# Patient Record
Sex: FEMALE | Race: WHITE | NOT HISPANIC OR LATINO | Employment: OTHER | URBAN - METROPOLITAN AREA
[De-identification: names, ages, dates, MRNs, and addresses within clinical notes are randomized per-mention and may not be internally consistent; named-entity substitution may affect disease eponyms.]

---

## 2018-02-15 ENCOUNTER — HISTORICAL (OUTPATIENT)
Dept: ENDOSCOPY | Facility: HOSPITAL | Age: 78
End: 2018-02-15

## 2022-04-11 ENCOUNTER — HISTORICAL (OUTPATIENT)
Dept: ADMINISTRATIVE | Facility: HOSPITAL | Age: 82
End: 2022-04-11

## 2022-04-29 VITALS
HEIGHT: 63 IN | DIASTOLIC BLOOD PRESSURE: 64 MMHG | OXYGEN SATURATION: 92 % | WEIGHT: 128.06 LBS | SYSTOLIC BLOOD PRESSURE: 97 MMHG | BODY MASS INDEX: 22.69 KG/M2

## 2022-04-30 NOTE — H&P
HISTORY OF PRESENT ILLNESS:  [age]-year-old white female referred for screening colonoscopy.  The patient has been doing well and denies any melena, hematochezia or change in bowel functions.  She was found to have diverticulosis during her last exam in 2008, but she had no evidence of polyps.    ALLERGIES:  NKDA    MEDICATIONS:    1. Omeprazole.  2. Celecoxib.  3. Cyclobenzaprine.  4. Folic acid.  5. Escitalopram.  6. Hydroxychloroquine.  7. _______ topical.  8. Methotrexate.  9. Simvastatin.    PAST MEDICAL HISTORY:    1. Gastroesophageal reflux disease.  2. Rheumatoid arthritis.  3. Ménière disease.  4. Hypercholesterolemia.    PAST SURGICAL HISTORY:    1. Appendectomy.  2. Cholecystectomy.  3. Toe surgery.    SOCIAL HISTORY:  She lives in [place],  with two grown children.  Retired .  No smoking, no alcohol.    FAMILY HISTORY:  Noncontributory    PHYSICAL EXAMINATION:  VITAL SIGNS:  Stable.  Afebrile.   GENERAL:  She is a healthy appearing middle-aged female in no acute distress.   HEENT:  Sclerae nonicteric.  Conjunctivae pink.   NECK:  No adenopathy or thyromegaly.   CARDIOVASCULAR:  Regular rate and rhythm.   LUNGS:  Clear.   ABDOMEN:  Soft, nontender.  Bowel sounds normal.  No mass or organomegaly appreciated.     EXTREMITIES:  No clubbing, cyanosis, or edema.   NEURO:  Alert and oriented.  No focal deficits.    IMPRESSION:  Average risk for colon polyps.    RECOMMENDATIONS:  Proceed with screening colonoscopy.        ______________________________  MD EMILIANO Martell/VIRI  DD:  02/15/2018  Time:  10:01AM  DT:  02/15/2018  Time:  10:29AM  Job #:  [id number]    The H&P was reviewed, the patient was examined, and the following changes to the patients condition are  noted:  ______________________________________________________________________________  ______________________________________________________________________________  ______________________________________________________________________________  [  ] No changes to the patient's condition:      ______________________________                                             ___________________  PHYSICIAN SIGNATURE                                                             DATE/TIME    cc: Rishi Hart M.D.

## 2023-04-19 ENCOUNTER — OFFICE VISIT (OUTPATIENT)
Dept: RHEUMATOLOGY | Facility: CLINIC | Age: 83
End: 2023-04-19
Payer: MEDICARE

## 2023-04-19 VITALS
TEMPERATURE: 99 F | OXYGEN SATURATION: 98 % | HEIGHT: 62 IN | WEIGHT: 126.19 LBS | DIASTOLIC BLOOD PRESSURE: 74 MMHG | HEART RATE: 73 BPM | BODY MASS INDEX: 23.22 KG/M2 | SYSTOLIC BLOOD PRESSURE: 118 MMHG

## 2023-04-19 DIAGNOSIS — M79.7 FIBROMYALGIA SYNDROME: ICD-10-CM

## 2023-04-19 DIAGNOSIS — G47.00 INSOMNIA, UNSPECIFIED TYPE: ICD-10-CM

## 2023-04-19 DIAGNOSIS — M06.9 RHEUMATOID ARTHRITIS, INVOLVING UNSPECIFIED SITE, UNSPECIFIED WHETHER RHEUMATOID FACTOR PRESENT: Primary | ICD-10-CM

## 2023-04-19 DIAGNOSIS — M81.0 OSTEOPOROSIS, UNSPECIFIED OSTEOPOROSIS TYPE, UNSPECIFIED PATHOLOGICAL FRACTURE PRESENCE: ICD-10-CM

## 2023-04-19 PROCEDURE — 1159F PR MEDICATION LIST DOCUMENTED IN MEDICAL RECORD: ICD-10-PCS | Mod: CPTII,S$GLB,, | Performed by: INTERNAL MEDICINE

## 2023-04-19 PROCEDURE — 3288F FALL RISK ASSESSMENT DOCD: CPT | Mod: CPTII,S$GLB,, | Performed by: INTERNAL MEDICINE

## 2023-04-19 PROCEDURE — 3078F DIAST BP <80 MM HG: CPT | Mod: CPTII,S$GLB,, | Performed by: INTERNAL MEDICINE

## 2023-04-19 PROCEDURE — 99999 PR PBB SHADOW E&M-EST. PATIENT-LVL III: CPT | Mod: PBBFAC,,, | Performed by: INTERNAL MEDICINE

## 2023-04-19 PROCEDURE — 1101F PR PT FALLS ASSESS DOC 0-1 FALLS W/OUT INJ PAST YR: ICD-10-PCS | Mod: CPTII,S$GLB,, | Performed by: INTERNAL MEDICINE

## 2023-04-19 PROCEDURE — 1125F PR PAIN SEVERITY QUANTIFIED, PAIN PRESENT: ICD-10-PCS | Mod: CPTII,S$GLB,, | Performed by: INTERNAL MEDICINE

## 2023-04-19 PROCEDURE — 1101F PT FALLS ASSESS-DOCD LE1/YR: CPT | Mod: CPTII,S$GLB,, | Performed by: INTERNAL MEDICINE

## 2023-04-19 PROCEDURE — 3288F PR FALLS RISK ASSESSMENT DOCUMENTED: ICD-10-PCS | Mod: CPTII,S$GLB,, | Performed by: INTERNAL MEDICINE

## 2023-04-19 PROCEDURE — 3074F PR MOST RECENT SYSTOLIC BLOOD PRESSURE < 130 MM HG: ICD-10-PCS | Mod: CPTII,S$GLB,, | Performed by: INTERNAL MEDICINE

## 2023-04-19 PROCEDURE — 1159F MED LIST DOCD IN RCRD: CPT | Mod: CPTII,S$GLB,, | Performed by: INTERNAL MEDICINE

## 2023-04-19 PROCEDURE — 99204 PR OFFICE/OUTPT VISIT, NEW, LEVL IV, 45-59 MIN: ICD-10-PCS | Mod: S$GLB,,, | Performed by: INTERNAL MEDICINE

## 2023-04-19 PROCEDURE — 3074F SYST BP LT 130 MM HG: CPT | Mod: CPTII,S$GLB,, | Performed by: INTERNAL MEDICINE

## 2023-04-19 PROCEDURE — 3078F PR MOST RECENT DIASTOLIC BLOOD PRESSURE < 80 MM HG: ICD-10-PCS | Mod: CPTII,S$GLB,, | Performed by: INTERNAL MEDICINE

## 2023-04-19 PROCEDURE — 1125F AMNT PAIN NOTED PAIN PRSNT: CPT | Mod: CPTII,S$GLB,, | Performed by: INTERNAL MEDICINE

## 2023-04-19 PROCEDURE — 99999 PR PBB SHADOW E&M-EST. PATIENT-LVL III: ICD-10-PCS | Mod: PBBFAC,,, | Performed by: INTERNAL MEDICINE

## 2023-04-19 PROCEDURE — 99204 OFFICE O/P NEW MOD 45 MIN: CPT | Mod: S$GLB,,, | Performed by: INTERNAL MEDICINE

## 2023-04-19 RX ORDER — LEVOCETIRIZINE DIHYDROCHLORIDE 5 MG/1
1 TABLET, FILM COATED ORAL DAILY
COMMUNITY
Start: 2022-09-29

## 2023-04-19 RX ORDER — EZETIMIBE 10 MG/1
1 TABLET ORAL DAILY
COMMUNITY
Start: 2023-04-10

## 2023-04-19 RX ORDER — LEVOTHYROXINE SODIUM 75 UG/1
1 TABLET ORAL DAILY
COMMUNITY

## 2023-04-19 RX ORDER — TIZANIDINE 2 MG/1
2 TABLET ORAL NIGHTLY
Qty: 30 TABLET | Refills: 5 | Status: SHIPPED | OUTPATIENT
Start: 2023-04-19 | End: 2023-05-10

## 2023-04-19 RX ORDER — ROSUVASTATIN CALCIUM 40 MG/1
1 TABLET, COATED ORAL DAILY
COMMUNITY
Start: 2023-04-10

## 2023-04-19 RX ORDER — FLUOXETINE HYDROCHLORIDE 40 MG/1
1 CAPSULE ORAL DAILY
COMMUNITY
Start: 2023-04-10

## 2023-04-19 RX ORDER — FOLIC ACID 1 MG/1
1 TABLET ORAL DAILY
COMMUNITY
Start: 2022-09-29 | End: 2023-04-19

## 2023-04-19 RX ORDER — OMEPRAZOLE 20 MG/1
1 CAPSULE, DELAYED RELEASE ORAL DAILY
COMMUNITY
Start: 2022-09-29

## 2023-04-19 RX ORDER — METHOTREXATE 2.5 MG/1
10 TABLET ORAL
Qty: 20 TABLET | Refills: 5 | Status: SHIPPED | OUTPATIENT
Start: 2023-04-19 | End: 2023-08-18 | Stop reason: SDUPTHER

## 2023-04-19 RX ORDER — FOLIC ACID 1 MG/1
1 TABLET ORAL DAILY
Qty: 30 TABLET | Refills: 5 | Status: SHIPPED | OUTPATIENT
Start: 2023-04-19 | End: 2023-08-18 | Stop reason: SDUPTHER

## 2023-04-19 RX ORDER — AMOXICILLIN 875 MG/1
1 TABLET, FILM COATED ORAL EVERY 12 HOURS
COMMUNITY

## 2023-04-19 RX ORDER — BUDESONIDE, GLYCOPYRROLATE, AND FORMOTEROL FUMARATE 160; 9; 4.8 UG/1; UG/1; UG/1
1 AEROSOL, METERED RESPIRATORY (INHALATION) 3 TIMES DAILY PRN
COMMUNITY
Start: 2023-03-07

## 2023-04-19 NOTE — PROGRESS NOTES
"Subjective:       Patient ID: Ginette Mcgovern is a 82 y.o. female.    Chief Complaint: Initial Visit (Initial visit. Patient states she was being treated for RA by another rheumatologist. She complains of pain in both knees and lower back. Pain 6/10. Patient was taking Prolia injections her last one was about 7 months ago. )    Pt  is complaining of joint pain involving his MCP PIP wrist elbow shoulders hips knees and ankles bilaterally.  Is 9/10 in intensity dull in quality and continuous.  It is associated with a morning stiffness lasting for more than 60 minutes. Pt reports having difficulty maintaining a good night of sleep and this has been associated with myalgia of 9/10 in intensity.  This pain is dull continuous and gets worse mainly at night.  It is associated with fatigue.  No fever no chills no others.    Labs checked during this visit       Review of Systems   Constitutional:  Negative for appetite change, chills and fever.   HENT:  Negative for congestion, ear pain, mouth sores, nosebleeds and trouble swallowing.    Eyes:  Negative for photophobia and discharge.   Respiratory:  Negative for chest tightness and shortness of breath.    Cardiovascular:  Negative for chest pain.   Gastrointestinal:  Negative for abdominal pain and vomiting.   Endocrine: Negative.    Genitourinary:  Negative for hematuria.   Musculoskeletal:         As per HPI   Skin:  Negative for rash.   Neurological:  Negative for weakness.       Objective:   /74 (BP Location: Left arm, Patient Position: Sitting, BP Method: Small (Automatic))   Pulse 73   Temp 98.6 °F (37 °C) (Oral)   Ht 5' 2" (1.575 m)   Wt 57.2 kg (126 lb 3.2 oz)   SpO2 98%   BMI 23.08 kg/m²      Physical Exam   Constitutional: She is oriented to person, place, and time. She appears well-developed and well-nourished. No distress.   HENT:   Head: Normocephalic and atraumatic.   Right Ear: External ear normal.   Left Ear: External ear normal.   Eyes: Pupils " are equal, round, and reactive to light.   Cardiovascular: Normal rate, regular rhythm and normal heart sounds.   Pulmonary/Chest: Breath sounds normal.   Abdominal: Soft. There is no abdominal tenderness.   Musculoskeletal:      Right shoulder: Tenderness present.      Left shoulder: Tenderness present.      Right elbow: Tenderness present.      Left elbow: Tenderness present.      Right wrist: Tenderness present.      Left wrist: Tenderness present.      Cervical back: Neck supple.      Right hip: Tenderness present.      Left hip: Tenderness present.      Right knee: Tenderness present.      Left knee: Tenderness present.      Right ankle: Tenderness present.      Left ankle: Tenderness present.   Lymphadenopathy:     She has no cervical adenopathy.   Neurological: She is alert and oriented to person, place, and time. She displays normal reflexes. No cranial nerve deficit or sensory deficit. She exhibits normal muscle tone. Coordination normal.   Skin: No rash noted. No erythema.   Vitals reviewed.      Right Side Rheumatological Exam     The patient is tender to palpation of the shoulder, elbow, wrist, knee, 1st PIP, 1st MCP, 2nd PIP, 2nd MCP, 3rd PIP, 3rd MCP, 4th PIP, 4th MCP, 5th PIP, hip, ankle, 1st MTP, 2nd MTP, 3rd MTP, 4th MTP, 5th MTP, 1st toe IP, 2nd toe IP, 3rd toe IP, 4th toe IP and 5th toe IP    Left Side Rheumatological Exam     The patient is tender to palpation of the shoulder, elbow, wrist, knee, 1st PIP, 1st MCP, 2nd PIP, 2nd MCP, 3rd PIP, 3rd MCP, 4th PIP, 4th MCP, 5th PIP, 5th MCP, hip, ankle, 1st MTP, 2nd MTP, 3rd MTP, 4th MTP, 5th MTP, 1st toe IP, 2nd toe IP, 3rd toe IP, 4th toe IP and 5th toe IP.       Completed Fibromyalgia exam 18/18 tender points.  No data to display     Orders for prolia written during this visit.     Assessment:       1. Rheumatoid arthritis, involving unspecified site, unspecified whether rheumatoid factor present    2. Fibromyalgia syndrome    3. Insomnia,  unspecified type    4. Osteoporosis, unspecified osteoporosis type, unspecified pathological fracture presence          Medication List with Changes/Refills   New Medications    FOLIC ACID (FOLVITE) 1 MG TABLET    Take 1 tablet (1 mg total) by mouth once daily. After food       Start Date: 4/19/2023 End Date: 10/16/2023    METHOTREXATE 2.5 MG TAB    Take 4 tablets (10 mg total) by mouth every 7 days. EVERY        TAKE 4 TAB TOGETHER AFTER SUPPER       Start Date: 4/19/2023 End Date: 10/16/2023    TIZANIDINE (ZANAFLEX) 2 MG TABLET    Take 1 tablet (2 mg total) by mouth nightly.       Start Date: 4/19/2023 End Date: 10/16/2023   Current Medications    AMOXICILLIN (AMOXIL) 875 MG TABLET    Take 1 tablet by mouth every 12 (twelve) hours.       Start Date: --        End Date: --    BREZTRI AEROSPHERE 160-9-4.8 MCG/ACTUATION HFAA    Inhale 1 puff into the lungs 3 (three) times daily as needed.       Start Date: 3/7/2023  End Date: --    EZETIMIBE (ZETIA) 10 MG TABLET    Take 1 tablet by mouth once daily.       Start Date: 4/10/2023 End Date: --    FLUOXETINE 40 MG CAPSULE    Take 1 capsule by mouth once daily.       Start Date: 4/10/2023 End Date: --    LEVOCETIRIZINE (XYZAL) 5 MG TABLET    Take 1 tablet by mouth once daily.       Start Date: 9/29/2022 End Date: --    LEVOTHYROXINE (SYNTHROID) 75 MCG TABLET    Take 1 tablet by mouth once daily.       Start Date: --        End Date: --    OMEPRAZOLE (PRILOSEC) 20 MG CAPSULE    Take 1 capsule by mouth once daily.       Start Date: 9/29/2022 End Date: --    ROSUVASTATIN (CRESTOR) 40 MG TAB    Take 1 tablet by mouth once daily.       Start Date: 4/10/2023 End Date: --   Discontinued Medications    FOLIC ACID (FOLVITE) 1 MG TABLET    Take 1 tablet by mouth once daily.       Start Date: 9/29/2022 End Date: 4/19/2023         Plan:         Problem List Items Addressed This Visit          Immunology/Multi System    Rheumatoid arthritis - Primary    Relevant Medications     methotrexate 2.5 MG Tab    folic acid (FOLVITE) 1 MG tablet    tiZANidine (ZANAFLEX) 2 MG tablet    Other Relevant Orders    CBC Auto Differential    Comprehensive Metabolic Panel    CRP, High Sensitivity    Vitamin D    Rheumatoid Quantitative    Cyclic Citrullinated Peptide Antibody, IgG    Antinuclear Ab, HEp-2 Substrate    Hepatitis B Surface Antigen    Hepatitis C Antibody    TSH    T4, Free       Orthopedic    Fibromyalgia syndrome    Relevant Medications    methotrexate 2.5 MG Tab    folic acid (FOLVITE) 1 MG tablet    tiZANidine (ZANAFLEX) 2 MG tablet    Other Relevant Orders    CBC Auto Differential    Comprehensive Metabolic Panel    CRP, High Sensitivity    Vitamin D    Rheumatoid Quantitative    Cyclic Citrullinated Peptide Antibody, IgG    Antinuclear Ab, HEp-2 Substrate    Hepatitis B Surface Antigen    Hepatitis C Antibody    TSH    T4, Free    Osteoporosis    Relevant Medications    methotrexate 2.5 MG Tab    folic acid (FOLVITE) 1 MG tablet    tiZANidine (ZANAFLEX) 2 MG tablet    Other Relevant Orders    CBC Auto Differential    Comprehensive Metabolic Panel    CRP, High Sensitivity    Vitamin D    Rheumatoid Quantitative    Cyclic Citrullinated Peptide Antibody, IgG    Antinuclear Ab, HEp-2 Substrate    Hepatitis B Surface Antigen    Hepatitis C Antibody    TSH    T4, Free       Other    Insomnia    Relevant Medications    methotrexate 2.5 MG Tab    folic acid (FOLVITE) 1 MG tablet    tiZANidine (ZANAFLEX) 2 MG tablet    Other Relevant Orders    CBC Auto Differential    Comprehensive Metabolic Panel    CRP, High Sensitivity    Vitamin D    Rheumatoid Quantitative    Cyclic Citrullinated Peptide Antibody, IgG    Antinuclear Ab, HEp-2 Substrate    Hepatitis B Surface Antigen    Hepatitis C Antibody    TSH    T4, Free

## 2023-05-10 ENCOUNTER — TELEPHONE (OUTPATIENT)
Dept: RHEUMATOLOGY | Facility: CLINIC | Age: 83
End: 2023-05-10
Payer: MEDICARE

## 2023-05-10 DIAGNOSIS — G47.00 INSOMNIA, UNSPECIFIED TYPE: Primary | ICD-10-CM

## 2023-05-10 RX ORDER — TEMAZEPAM 15 MG/1
15 CAPSULE ORAL NIGHTLY
Qty: 30 CAPSULE | Refills: 5 | Status: SHIPPED | OUTPATIENT
Start: 2023-05-10 | End: 2023-08-18 | Stop reason: SDUPTHER

## 2023-05-10 NOTE — TELEPHONE ENCOUNTER
----- Message from Hali Alvarez sent at 5/10/2023  9:18 AM CDT -----  Regarding: Stiffness  Daughter called stating patient was prescribed medication to be taken at night to help with morning stiffness. Medication is not working. Any suggestions. [facility].

## 2023-05-26 ENCOUNTER — INFUSION (OUTPATIENT)
Dept: INFUSION THERAPY | Facility: HOSPITAL | Age: 83
End: 2023-05-26
Attending: FAMILY MEDICINE
Payer: MEDICARE

## 2023-05-26 DIAGNOSIS — M81.0 OSTEOPOROSIS, UNSPECIFIED OSTEOPOROSIS TYPE, UNSPECIFIED PATHOLOGICAL FRACTURE PRESENCE: Primary | ICD-10-CM

## 2023-05-26 PROCEDURE — 96372 THER/PROPH/DIAG INJ SC/IM: CPT

## 2023-05-26 PROCEDURE — 63600175 PHARM REV CODE 636 W HCPCS: Mod: JZ,JG | Performed by: INTERNAL MEDICINE

## 2023-05-26 RX ADMIN — DENOSUMAB 60 MG: 60 INJECTION SUBCUTANEOUS at 08:05

## 2023-08-18 ENCOUNTER — OFFICE VISIT (OUTPATIENT)
Dept: RHEUMATOLOGY | Facility: CLINIC | Age: 83
End: 2023-08-18
Payer: MEDICARE

## 2023-08-18 VITALS
DIASTOLIC BLOOD PRESSURE: 61 MMHG | BODY MASS INDEX: 22.2 KG/M2 | HEART RATE: 89 BPM | WEIGHT: 120.63 LBS | TEMPERATURE: 98 F | SYSTOLIC BLOOD PRESSURE: 95 MMHG | OXYGEN SATURATION: 96 % | RESPIRATION RATE: 18 BRPM | HEIGHT: 62 IN

## 2023-08-18 DIAGNOSIS — M06.9 RHEUMATOID ARTHRITIS, INVOLVING UNSPECIFIED SITE, UNSPECIFIED WHETHER RHEUMATOID FACTOR PRESENT: Primary | ICD-10-CM

## 2023-08-18 DIAGNOSIS — M81.0 OSTEOPOROSIS, UNSPECIFIED OSTEOPOROSIS TYPE, UNSPECIFIED PATHOLOGICAL FRACTURE PRESENCE: ICD-10-CM

## 2023-08-18 DIAGNOSIS — M79.7 FIBROMYALGIA SYNDROME: ICD-10-CM

## 2023-08-18 DIAGNOSIS — G47.00 INSOMNIA, UNSPECIFIED TYPE: ICD-10-CM

## 2023-08-18 PROCEDURE — 3074F PR MOST RECENT SYSTOLIC BLOOD PRESSURE < 130 MM HG: ICD-10-PCS | Mod: CPTII,S$GLB,, | Performed by: INTERNAL MEDICINE

## 2023-08-18 PROCEDURE — 1159F PR MEDICATION LIST DOCUMENTED IN MEDICAL RECORD: ICD-10-PCS | Mod: CPTII,S$GLB,, | Performed by: INTERNAL MEDICINE

## 2023-08-18 PROCEDURE — 3288F FALL RISK ASSESSMENT DOCD: CPT | Mod: CPTII,S$GLB,, | Performed by: INTERNAL MEDICINE

## 2023-08-18 PROCEDURE — 99214 OFFICE O/P EST MOD 30 MIN: CPT | Mod: S$GLB,,, | Performed by: INTERNAL MEDICINE

## 2023-08-18 PROCEDURE — 3078F PR MOST RECENT DIASTOLIC BLOOD PRESSURE < 80 MM HG: ICD-10-PCS | Mod: CPTII,S$GLB,, | Performed by: INTERNAL MEDICINE

## 2023-08-18 PROCEDURE — 3078F DIAST BP <80 MM HG: CPT | Mod: CPTII,S$GLB,, | Performed by: INTERNAL MEDICINE

## 2023-08-18 PROCEDURE — 99214 PR OFFICE/OUTPT VISIT, EST, LEVL IV, 30-39 MIN: ICD-10-PCS | Mod: S$GLB,,, | Performed by: INTERNAL MEDICINE

## 2023-08-18 PROCEDURE — 1159F MED LIST DOCD IN RCRD: CPT | Mod: CPTII,S$GLB,, | Performed by: INTERNAL MEDICINE

## 2023-08-18 PROCEDURE — 3288F PR FALLS RISK ASSESSMENT DOCUMENTED: ICD-10-PCS | Mod: CPTII,S$GLB,, | Performed by: INTERNAL MEDICINE

## 2023-08-18 PROCEDURE — 1101F PT FALLS ASSESS-DOCD LE1/YR: CPT | Mod: CPTII,S$GLB,, | Performed by: INTERNAL MEDICINE

## 2023-08-18 PROCEDURE — 1125F AMNT PAIN NOTED PAIN PRSNT: CPT | Mod: CPTII,S$GLB,, | Performed by: INTERNAL MEDICINE

## 2023-08-18 PROCEDURE — 3074F SYST BP LT 130 MM HG: CPT | Mod: CPTII,S$GLB,, | Performed by: INTERNAL MEDICINE

## 2023-08-18 PROCEDURE — 99999 PR PBB SHADOW E&M-EST. PATIENT-LVL IV: ICD-10-PCS | Mod: PBBFAC,,, | Performed by: INTERNAL MEDICINE

## 2023-08-18 PROCEDURE — 1160F RVW MEDS BY RX/DR IN RCRD: CPT | Mod: CPTII,S$GLB,, | Performed by: INTERNAL MEDICINE

## 2023-08-18 PROCEDURE — 1101F PR PT FALLS ASSESS DOC 0-1 FALLS W/OUT INJ PAST YR: ICD-10-PCS | Mod: CPTII,S$GLB,, | Performed by: INTERNAL MEDICINE

## 2023-08-18 PROCEDURE — 99999 PR PBB SHADOW E&M-EST. PATIENT-LVL IV: CPT | Mod: PBBFAC,,, | Performed by: INTERNAL MEDICINE

## 2023-08-18 PROCEDURE — 1160F PR REVIEW ALL MEDS BY PRESCRIBER/CLIN PHARMACIST DOCUMENTED: ICD-10-PCS | Mod: CPTII,S$GLB,, | Performed by: INTERNAL MEDICINE

## 2023-08-18 PROCEDURE — 1125F PR PAIN SEVERITY QUANTIFIED, PAIN PRESENT: ICD-10-PCS | Mod: CPTII,S$GLB,, | Performed by: INTERNAL MEDICINE

## 2023-08-18 RX ORDER — TIOTROPIUM BROMIDE AND OLODATEROL 3.124; 2.736 UG/1; UG/1
2 SPRAY, METERED RESPIRATORY (INHALATION) DAILY
COMMUNITY
Start: 2022-09-29

## 2023-08-18 RX ORDER — ESCITALOPRAM OXALATE 20 MG/1
20 TABLET ORAL DAILY
COMMUNITY
Start: 2022-09-29

## 2023-08-18 RX ORDER — FLUTICASONE PROPIONATE 50 UG/1
1 POWDER, METERED RESPIRATORY (INHALATION)
COMMUNITY
Start: 2022-09-29

## 2023-08-18 RX ORDER — TEMAZEPAM 15 MG/1
15 CAPSULE ORAL NIGHTLY
Qty: 30 CAPSULE | Refills: 5 | Status: SHIPPED | OUTPATIENT
Start: 2023-08-18 | End: 2024-02-14

## 2023-08-18 RX ORDER — SIMVASTATIN 40 MG/1
40 TABLET, FILM COATED ORAL DAILY
COMMUNITY
Start: 2022-09-29

## 2023-08-18 RX ORDER — MONTELUKAST SODIUM 10 MG/1
10 TABLET ORAL DAILY
COMMUNITY
Start: 2022-09-29

## 2023-08-18 RX ORDER — DENOSUMAB 60 MG/ML
60 INJECTION SUBCUTANEOUS
COMMUNITY
Start: 2022-09-29

## 2023-08-18 RX ORDER — METOPROLOL SUCCINATE 25 MG/1
25 TABLET, EXTENDED RELEASE ORAL DAILY
COMMUNITY
Start: 2023-06-09

## 2023-08-18 RX ORDER — TIZANIDINE 2 MG/1
1 TABLET ORAL NIGHTLY
COMMUNITY
End: 2023-08-18 | Stop reason: SDUPTHER

## 2023-08-18 RX ORDER — TIZANIDINE 2 MG/1
2 TABLET ORAL NIGHTLY
Qty: 30 TABLET | Refills: 5 | Status: SHIPPED | OUTPATIENT
Start: 2023-08-18

## 2023-08-18 RX ORDER — FOLIC ACID 1 MG/1
1 TABLET ORAL DAILY
Qty: 30 TABLET | Refills: 5 | Status: SHIPPED | OUTPATIENT
Start: 2023-08-18 | End: 2023-10-10

## 2023-08-18 RX ORDER — METHOTREXATE 2.5 MG/1
15 TABLET ORAL
Qty: 30 TABLET | Refills: 5 | Status: SHIPPED | OUTPATIENT
Start: 2023-08-18 | End: 2024-02-14

## 2023-08-18 NOTE — PROGRESS NOTES
"Subjective:       Patient ID: Ginette Mcgovern is a 83 y.o. female.    Chief Complaint: Disease Management (4 month follow up )    The patient is complaining of joint pain involving the MCP PIP wrist elbow shoulders hips knees and ankles bilaterally.  The pain is 2/10 in intensity dull in quality and continuous.  That is associated with a morning stiffness lasting for more than 60 minutes.  Is also having difficulty maintaining a good night of sleep.  This has been associated with myalgias.  Muscle aches are 3/10 in intensity dull in quality and continuous.  They are associated with fatigue.  No fever no chills no others.  Labs checked during this visit         Review of Systems   Constitutional:  Negative for appetite change, chills and fever.   HENT:  Negative for congestion, ear pain, mouth sores, nosebleeds and trouble swallowing.    Eyes:  Negative for photophobia and discharge.   Respiratory:  Negative for chest tightness and shortness of breath.    Cardiovascular:  Negative for chest pain.   Gastrointestinal:  Negative for abdominal pain and vomiting.   Endocrine: Negative.    Genitourinary:  Negative for hematuria.   Musculoskeletal:         As per HPI   Skin:  Negative for rash.   Neurological:  Negative for weakness.         Objective:   BP 95/61 (BP Location: Left arm, Patient Position: Sitting, BP Method: Medium (Automatic))   Pulse 89   Temp 97.6 °F (36.4 °C)   Resp 18   Ht 5' 2" (1.575 m)   Wt 54.7 kg (120 lb 9.6 oz)   SpO2 96%   BMI 22.06 kg/m²      Physical Exam   Constitutional: She is oriented to person, place, and time. She appears well-developed and well-nourished. No distress.   HENT:   Head: Normocephalic and atraumatic.   Right Ear: External ear normal.   Left Ear: External ear normal.   Eyes: Pupils are equal, round, and reactive to light.   Cardiovascular: Normal rate, regular rhythm and normal heart sounds.   Pulmonary/Chest: Breath sounds normal.   Abdominal: Soft. There is no " abdominal tenderness.   Musculoskeletal:      Right shoulder: Tenderness present.      Left shoulder: Tenderness present.      Right elbow: Tenderness present.      Left elbow: Tenderness present.      Right wrist: Tenderness present.      Left wrist: Tenderness present.      Cervical back: Neck supple.      Right hip: Tenderness present.      Left hip: Tenderness present.      Right knee: Tenderness present.      Left knee: Tenderness present.      Right ankle: Tenderness present.      Left ankle: Tenderness present.   Lymphadenopathy:     She has no cervical adenopathy.   Neurological: She is alert and oriented to person, place, and time. She displays normal reflexes. No cranial nerve deficit or sensory deficit. She exhibits normal muscle tone. Coordination normal.   Skin: No rash noted. No erythema.   Vitals reviewed.      Right Side Rheumatological Exam     The patient is tender to palpation of the shoulder, elbow, wrist, knee, 1st PIP, 1st MCP, 2nd PIP, 2nd MCP, 3rd PIP, 3rd MCP, 4th PIP, 4th MCP, 5th PIP, hip, ankle, 1st MTP, 2nd MTP, 3rd MTP, 4th MTP, 5th MTP, 1st toe IP, 2nd toe IP, 3rd toe IP, 4th toe IP and 5th toe IP    Left Side Rheumatological Exam     The patient is tender to palpation of the shoulder, elbow, wrist, knee, 1st PIP, 1st MCP, 2nd PIP, 2nd MCP, 3rd PIP, 3rd MCP, 4th PIP, 4th MCP, 5th PIP, 5th MCP, hip, ankle, 1st MTP, 2nd MTP, 3rd MTP, 4th MTP, 5th MTP, 1st toe IP, 2nd toe IP, 3rd toe IP, 4th toe IP and 5th toe IP.         Completed Fibromyalgia exam 11/18 tender points.  No data to display     Assessment:       1. Rheumatoid arthritis, involving unspecified site, unspecified whether rheumatoid factor present    2. Osteoporosis, unspecified osteoporosis type, unspecified pathological fracture presence    3. Fibromyalgia syndrome    4. Insomnia, unspecified type          Medication List with Changes/Refills   Current Medications    AMOXICILLIN (AMOXIL) 875 MG TABLET    Take 1 tablet by  mouth every 12 (twelve) hours.       Start Date: --        End Date: --    BREZTRI AEROSPHERE 160-9-4.8 MCG/ACTUATION HFAA    Inhale 1 puff into the lungs 3 (three) times daily as needed.       Start Date: 3/7/2023  End Date: --    DENOSUMAB (PROLIA) 60 MG/ML SYRG    Inject 60 mg into the skin every 6 (six) months.       Start Date: 9/29/2022 End Date: --    ESCITALOPRAM OXALATE (LEXAPRO) 20 MG TABLET    Take 20 mg by mouth once daily.       Start Date: 9/29/2022 End Date: --    EZETIMIBE (ZETIA) 10 MG TABLET    Take 1 tablet by mouth once daily.       Start Date: 4/10/2023 End Date: --    FLUOXETINE 40 MG CAPSULE    Take 1 capsule by mouth once daily.       Start Date: 4/10/2023 End Date: --    FLUTICASONE PROPIONATE (FLOVENT DISKUS) 50 MCG/ACTUATION DSDV    Inhale 1 puff into the lungs.       Start Date: 9/29/2022 End Date: --    LEVOCETIRIZINE (XYZAL) 5 MG TABLET    Take 1 tablet by mouth once daily.       Start Date: 9/29/2022 End Date: --    LEVOTHYROXINE (SYNTHROID) 75 MCG TABLET    Take 1 tablet by mouth once daily.       Start Date: --        End Date: --    METOPROLOL SUCCINATE (TOPROL-XL) 25 MG 24 HR TABLET    Take 25 mg by mouth once daily.       Start Date: 6/9/2023  End Date: --    MONTELUKAST (SINGULAIR) 10 MG TABLET    Take 10 mg by mouth once daily.       Start Date: 9/29/2022 End Date: --    OMEPRAZOLE (PRILOSEC) 20 MG CAPSULE    Take 1 capsule by mouth once daily.       Start Date: 9/29/2022 End Date: --    ROSUVASTATIN (CRESTOR) 40 MG TAB    Take 1 tablet by mouth once daily.       Start Date: 4/10/2023 End Date: --    SIMVASTATIN (ZOCOR) 40 MG TABLET    Take 40 mg by mouth once daily.       Start Date: 9/29/2022 End Date: --    TIOTROPIUM-OLODATEROL (STIOLTO RESPIMAT) 2.5-2.5 MCG/ACTUATION MIST    Inhale 2 puffs into the lungs once daily.       Start Date: 9/29/2022 End Date: --   Changed and/or Refilled Medications    Modified Medication Previous Medication    FOLIC ACID (FOLVITE) 1 MG TABLET  folic acid (FOLVITE) 1 MG tablet       Take 1 tablet (1 mg total) by mouth once daily. After food    Take 1 tablet (1 mg total) by mouth once daily. After food       Start Date: 8/18/2023 End Date: 2/14/2024    Start Date: 4/19/2023 End Date: 8/18/2023    METHOTREXATE 2.5 MG TAB methotrexate 2.5 MG Tab       Take 6 tablets (15 mg total) by mouth every 7 days. EVERY    THURSDAY    TAKE 3 TAB AFTER LUNCH AND 3 TAB AFTER SUPPER    Take 4 tablets (10 mg total) by mouth every 7 days. EVERY        TAKE 4 TAB TOGETHER AFTER SUPPER       Start Date: 8/18/2023 End Date: 2/14/2024    Start Date: 4/19/2023 End Date: 8/18/2023    TEMAZEPAM (RESTORIL) 15 MG CAP temazepam (RESTORIL) 15 mg Cap       Take 1 capsule (15 mg total) by mouth nightly.    Take 1 capsule (15 mg total) by mouth nightly.       Start Date: 8/18/2023 End Date: 2/14/2024    Start Date: 5/10/2023 End Date: 8/18/2023    TIZANIDINE (ZANAFLEX) 2 MG TABLET tiZANidine (ZANAFLEX) 2 MG tablet       Take 1 tablet (2 mg total) by mouth nightly.    Take 1 tablet by mouth every evening.       Start Date: 8/18/2023 End Date: --    Start Date: --        End Date: 8/18/2023         Plan:         Problem List Items Addressed This Visit          Immunology/Multi System    Rheumatoid arthritis - Primary    Relevant Medications    folic acid (FOLVITE) 1 MG tablet    methotrexate 2.5 MG Tab    temazepam (RESTORIL) 15 mg Cap    tiZANidine (ZANAFLEX) 2 MG tablet       Endocrine    Osteoporosis    Relevant Medications    folic acid (FOLVITE) 1 MG tablet    methotrexate 2.5 MG Tab    temazepam (RESTORIL) 15 mg Cap    tiZANidine (ZANAFLEX) 2 MG tablet       Orthopedic    Fibromyalgia syndrome    Relevant Medications    folic acid (FOLVITE) 1 MG tablet    methotrexate 2.5 MG Tab    temazepam (RESTORIL) 15 mg Cap    tiZANidine (ZANAFLEX) 2 MG tablet       Other    Insomnia    Relevant Medications    folic acid (FOLVITE) 1 MG tablet    methotrexate 2.5 MG Tab    temazepam (RESTORIL) 15  mg Cap    tiZANidine (ZANAFLEX) 2 MG tablet       I placed the orders for prolia today during the visit. It needs to be given in novemeber 2023

## 2023-10-09 DIAGNOSIS — M79.7 FIBROMYALGIA SYNDROME: ICD-10-CM

## 2023-10-09 DIAGNOSIS — G47.00 INSOMNIA, UNSPECIFIED TYPE: ICD-10-CM

## 2023-10-09 DIAGNOSIS — M81.0 OSTEOPOROSIS, UNSPECIFIED OSTEOPOROSIS TYPE, UNSPECIFIED PATHOLOGICAL FRACTURE PRESENCE: ICD-10-CM

## 2023-10-09 DIAGNOSIS — M06.9 RHEUMATOID ARTHRITIS, INVOLVING UNSPECIFIED SITE, UNSPECIFIED WHETHER RHEUMATOID FACTOR PRESENT: ICD-10-CM

## 2023-10-10 RX ORDER — FOLIC ACID 1 MG/1
1000 TABLET ORAL
Qty: 30 TABLET | Refills: 12 | Status: SHIPPED | OUTPATIENT
Start: 2023-10-10

## 2023-11-22 ENCOUNTER — INFUSION (OUTPATIENT)
Dept: INFUSION THERAPY | Facility: HOSPITAL | Age: 83
End: 2023-11-22
Attending: FAMILY MEDICINE
Payer: MEDICARE

## 2023-11-22 VITALS
RESPIRATION RATE: 18 BRPM | SYSTOLIC BLOOD PRESSURE: 106 MMHG | OXYGEN SATURATION: 94 % | DIASTOLIC BLOOD PRESSURE: 68 MMHG | HEART RATE: 88 BPM

## 2023-11-22 DIAGNOSIS — M81.0 OSTEOPOROSIS, UNSPECIFIED OSTEOPOROSIS TYPE, UNSPECIFIED PATHOLOGICAL FRACTURE PRESENCE: Primary | ICD-10-CM

## 2023-11-22 PROCEDURE — 63600175 PHARM REV CODE 636 W HCPCS: Mod: JZ,JG | Performed by: INTERNAL MEDICINE

## 2023-11-22 PROCEDURE — 96372 THER/PROPH/DIAG INJ SC/IM: CPT

## 2023-11-22 RX ADMIN — DENOSUMAB 60 MG: 60 INJECTION SUBCUTANEOUS at 08:11

## 2024-07-30 ENCOUNTER — DOCUMENTATION ONLY (OUTPATIENT)
Dept: RHEUMATOLOGY | Facility: CLINIC | Age: 84
End: 2024-07-30
Payer: MEDICARE

## 2025-06-29 ENCOUNTER — HOSPITAL ENCOUNTER (INPATIENT)
Facility: HOSPITAL | Age: 85
LOS: 1 days | Discharge: HOME HEALTH | DRG: 192 | End: 2025-07-02
Attending: FAMILY MEDICINE | Admitting: INTERNAL MEDICINE
Payer: MEDICARE

## 2025-06-29 DIAGNOSIS — R06.02 SOB (SHORTNESS OF BREATH): ICD-10-CM

## 2025-06-29 DIAGNOSIS — J44.1 COPD EXACERBATION: ICD-10-CM

## 2025-06-29 DIAGNOSIS — J44.1 ACUTE EXACERBATION OF CHRONIC OBSTRUCTIVE PULMONARY DISEASE (COPD): Primary | ICD-10-CM

## 2025-06-29 DIAGNOSIS — R07.9 CHEST PAIN: ICD-10-CM

## 2025-06-29 LAB
A-ADO2 BLOOD GAS (OHS): 50 MMHG
ALBUMIN SERPL-MCNC: 3.5 G/DL (ref 3.4–4.8)
ALBUMIN/GLOB SERPL: 1 RATIO (ref 1.1–2)
ALLENS TEST BLOOD GAS (OHS): ABNORMAL
ALP SERPL-CCNC: 51 UNIT/L (ref 40–150)
ALT SERPL-CCNC: 25 UNIT/L (ref 0–55)
ANION GAP SERPL CALC-SCNC: 11 MEQ/L
AST SERPL-CCNC: 37 UNIT/L (ref 11–45)
BASE EXCESS BLD CALC-SCNC: 3.4 MMOL/L (ref -2–2)
BASOPHILS # BLD AUTO: 0.02 X10(3)/MCL
BASOPHILS NFR BLD AUTO: 0.2 %
BILIRUB SERPL-MCNC: 0.8 MG/DL
BLOOD GAS SAMPLE TYPE (OHS): ABNORMAL
BNP BLD-MCNC: 106.8 PG/ML
BUN SERPL-MCNC: 15.4 MG/DL (ref 9.8–20.1)
CALCIUM SERPL-MCNC: 8 MG/DL (ref 8.4–10.2)
CHLORIDE SERPL-SCNC: 107 MMOL/L (ref 98–107)
CO2 BLDA-SCNC: 27.8 MMOL/L (ref 22–26)
CO2 SERPL-SCNC: 22 MMOL/L (ref 23–31)
COHGB MFR BLDA: 2.3 % (ref 0.5–1.5)
CREAT SERPL-MCNC: 0.71 MG/DL (ref 0.55–1.02)
CREAT/UREA NIT SERPL: 22
D DIMER PPP IA.FEU-MCNC: 0.79 UG/ML FEU (ref 0–0.5)
DRAWN BY BLOOD GAS (OHS): ABNORMAL
EOSINOPHIL # BLD AUTO: 0 X10(3)/MCL (ref 0–0.9)
EOSINOPHIL NFR BLD AUTO: 0 %
ERYTHROCYTE [DISTWIDTH] IN BLOOD BY AUTOMATED COUNT: 14.3 % (ref 11.5–17)
GAS PNL BLD: 106 MMHG
GFR SERPLBLD CREATININE-BSD FMLA CKD-EPI: >60 ML/MIN/1.73/M2
GLOBULIN SER-MCNC: 3.6 GM/DL (ref 2.4–3.5)
GLUCOSE SERPL-MCNC: 128 MG/DL (ref 82–115)
HCO3 BLDA-SCNC: 26.7 MMOL/L (ref 22–26)
HCT VFR BLD AUTO: 35 % (ref 37–47)
HGB BLD-MCNC: 11.8 G/DL (ref 12–16)
HOLD SPECIMEN: NORMAL
HOLD SPECIMEN: NORMAL
IMM GRANULOCYTES # BLD AUTO: 0.02 X10(3)/MCL (ref 0–0.04)
IMM GRANULOCYTES NFR BLD AUTO: 0.2 %
INHALED O2 CONCENTRATION: 21 %
INR PPP: 1.1
LACTATE SERPL-SCNC: 1.6 MMOL/L (ref 0.5–2.2)
LYMPHOCYTES # BLD AUTO: 0.67 X10(3)/MCL (ref 0.6–4.6)
LYMPHOCYTES NFR BLD AUTO: 7.9 %
MCH RBC QN AUTO: 33.4 PG (ref 27–31)
MCHC RBC AUTO-ENTMCNC: 33.7 G/DL (ref 33–36)
MCV RBC AUTO: 99.2 FL (ref 80–94)
METHGB MFR BLDA: 0.6 % (ref 0–1.5)
MONOCYTES # BLD AUTO: 0.51 X10(3)/MCL (ref 0.1–1.3)
MONOCYTES NFR BLD AUTO: 6 %
NEUTROPHILS # BLD AUTO: 7.29 X10(3)/MCL (ref 2.1–9.2)
NEUTROPHILS NFR BLD AUTO: 85.7 %
NRBC BLD AUTO-RTO: 0 %
O2 HB BLOOD GAS (OHS): 88.9 % (ref 94–100)
OXYHGB MFR BLDA: 11 G/DL (ref 12–18)
PCO2 BLDA: 35 MMHG (ref 35–45)
PH BLDA: 7.49 [PH] (ref 7.35–7.45)
PLATELET # BLD AUTO: 179 X10(3)/MCL (ref 130–400)
PMV BLD AUTO: 9.8 FL (ref 7.4–10.4)
PO2 BLDA: 56 MMHG (ref 75–100)
POTASSIUM SERPL-SCNC: 3.6 MMOL/L (ref 3.5–5.1)
PROT SERPL-MCNC: 7.1 GM/DL (ref 5.8–7.6)
PROTHROMBIN TIME: 14.9 SECONDS (ref 11.4–14)
RBC # BLD AUTO: 3.53 X10(6)/MCL (ref 4.2–5.4)
SAMPLE SITE BLOOD GAS (OHS): ABNORMAL
SAO2 % BLDA: 91.6 %
SODIUM SERPL-SCNC: 140 MMOL/L (ref 136–145)
TROPONIN I SERPL-MCNC: 0.03 NG/ML (ref 0–0.04)
WBC # BLD AUTO: 8.51 X10(3)/MCL (ref 4.5–11.5)

## 2025-06-29 PROCEDURE — 25000242 PHARM REV CODE 250 ALT 637 W/ HCPCS: Performed by: FAMILY MEDICINE

## 2025-06-29 PROCEDURE — 83605 ASSAY OF LACTIC ACID: CPT | Performed by: FAMILY MEDICINE

## 2025-06-29 PROCEDURE — 87040 BLOOD CULTURE FOR BACTERIA: CPT | Performed by: FAMILY MEDICINE

## 2025-06-29 PROCEDURE — 96375 TX/PRO/DX INJ NEW DRUG ADDON: CPT

## 2025-06-29 PROCEDURE — 96376 TX/PRO/DX INJ SAME DRUG ADON: CPT

## 2025-06-29 PROCEDURE — 96365 THER/PROPH/DIAG IV INF INIT: CPT

## 2025-06-29 PROCEDURE — 94640 AIRWAY INHALATION TREATMENT: CPT

## 2025-06-29 PROCEDURE — 99285 EMERGENCY DEPT VISIT HI MDM: CPT | Mod: 25

## 2025-06-29 PROCEDURE — 63600175 PHARM REV CODE 636 W HCPCS: Performed by: FAMILY MEDICINE

## 2025-06-29 PROCEDURE — 36600 WITHDRAWAL OF ARTERIAL BLOOD: CPT

## 2025-06-29 PROCEDURE — 27000221 HC OXYGEN, UP TO 24 HOURS

## 2025-06-29 PROCEDURE — 25000003 PHARM REV CODE 250: Performed by: FAMILY MEDICINE

## 2025-06-29 PROCEDURE — 80053 COMPREHEN METABOLIC PANEL: CPT | Performed by: FAMILY MEDICINE

## 2025-06-29 PROCEDURE — 85025 COMPLETE CBC W/AUTO DIFF WBC: CPT | Performed by: FAMILY MEDICINE

## 2025-06-29 PROCEDURE — 85610 PROTHROMBIN TIME: CPT | Performed by: FAMILY MEDICINE

## 2025-06-29 PROCEDURE — G0378 HOSPITAL OBSERVATION PER HR: HCPCS

## 2025-06-29 PROCEDURE — 84484 ASSAY OF TROPONIN QUANT: CPT | Performed by: FAMILY MEDICINE

## 2025-06-29 PROCEDURE — 83880 ASSAY OF NATRIURETIC PEPTIDE: CPT | Performed by: FAMILY MEDICINE

## 2025-06-29 PROCEDURE — 25500020 PHARM REV CODE 255: Performed by: FAMILY MEDICINE

## 2025-06-29 PROCEDURE — 94761 N-INVAS EAR/PLS OXIMETRY MLT: CPT | Mod: XB

## 2025-06-29 PROCEDURE — 85379 FIBRIN DEGRADATION QUANT: CPT | Performed by: FAMILY MEDICINE

## 2025-06-29 PROCEDURE — 99900035 HC TECH TIME PER 15 MIN (STAT)

## 2025-06-29 PROCEDURE — 93005 ELECTROCARDIOGRAM TRACING: CPT

## 2025-06-29 PROCEDURE — 82803 BLOOD GASES ANY COMBINATION: CPT

## 2025-06-29 RX ORDER — IPRATROPIUM BROMIDE AND ALBUTEROL SULFATE 2.5; .5 MG/3ML; MG/3ML
3 SOLUTION RESPIRATORY (INHALATION)
Status: COMPLETED | OUTPATIENT
Start: 2025-06-29 | End: 2025-06-29

## 2025-06-29 RX ORDER — GLUCAGON 1 MG
1 KIT INJECTION
Status: DISCONTINUED | OUTPATIENT
Start: 2025-06-29 | End: 2025-07-02 | Stop reason: HOSPADM

## 2025-06-29 RX ORDER — CEFTRIAXONE 1 G/1
1 INJECTION, POWDER, FOR SOLUTION INTRAMUSCULAR; INTRAVENOUS
Status: DISCONTINUED | OUTPATIENT
Start: 2025-06-30 | End: 2025-07-01

## 2025-06-29 RX ORDER — MAGNESIUM SULFATE HEPTAHYDRATE 40 MG/ML
2 INJECTION, SOLUTION INTRAVENOUS ONCE
Status: COMPLETED | OUTPATIENT
Start: 2025-06-29 | End: 2025-06-29

## 2025-06-29 RX ORDER — ENOXAPARIN SODIUM 100 MG/ML
40 INJECTION SUBCUTANEOUS EVERY 24 HOURS
Status: DISCONTINUED | OUTPATIENT
Start: 2025-06-29 | End: 2025-06-30

## 2025-06-29 RX ORDER — PANTOPRAZOLE SODIUM 40 MG/1
40 TABLET, DELAYED RELEASE ORAL DAILY
Status: DISCONTINUED | OUTPATIENT
Start: 2025-06-30 | End: 2025-07-02 | Stop reason: HOSPADM

## 2025-06-29 RX ORDER — ALBUTEROL SULFATE 0.83 MG/ML
5 SOLUTION RESPIRATORY (INHALATION)
Status: COMPLETED | OUTPATIENT
Start: 2025-06-29 | End: 2025-06-29

## 2025-06-29 RX ORDER — NALOXONE HCL 0.4 MG/ML
0.02 VIAL (ML) INJECTION
Status: DISCONTINUED | OUTPATIENT
Start: 2025-06-29 | End: 2025-07-02 | Stop reason: HOSPADM

## 2025-06-29 RX ORDER — SODIUM CHLORIDE 0.9 % (FLUSH) 0.9 %
10 SYRINGE (ML) INJECTION EVERY 12 HOURS PRN
Status: DISCONTINUED | OUTPATIENT
Start: 2025-06-29 | End: 2025-07-02 | Stop reason: HOSPADM

## 2025-06-29 RX ORDER — ACETAMINOPHEN 325 MG/1
650 TABLET ORAL EVERY 4 HOURS PRN
Status: DISCONTINUED | OUTPATIENT
Start: 2025-06-29 | End: 2025-07-02 | Stop reason: HOSPADM

## 2025-06-29 RX ORDER — CEFTRIAXONE 1 G/1
1 INJECTION, POWDER, FOR SOLUTION INTRAMUSCULAR; INTRAVENOUS
Status: COMPLETED | OUTPATIENT
Start: 2025-06-29 | End: 2025-06-29

## 2025-06-29 RX ORDER — ATORVASTATIN CALCIUM 40 MG/1
80 TABLET, FILM COATED ORAL NIGHTLY
Status: DISCONTINUED | OUTPATIENT
Start: 2025-06-29 | End: 2025-06-30

## 2025-06-29 RX ORDER — ONDANSETRON 4 MG/1
8 TABLET, ORALLY DISINTEGRATING ORAL EVERY 8 HOURS PRN
Status: DISCONTINUED | OUTPATIENT
Start: 2025-06-29 | End: 2025-07-02 | Stop reason: HOSPADM

## 2025-06-29 RX ORDER — FLUOXETINE 20 MG/1
40 CAPSULE ORAL DAILY
Status: DISCONTINUED | OUTPATIENT
Start: 2025-06-30 | End: 2025-07-02 | Stop reason: HOSPADM

## 2025-06-29 RX ORDER — IBUPROFEN 200 MG
24 TABLET ORAL
Status: DISCONTINUED | OUTPATIENT
Start: 2025-06-29 | End: 2025-07-02 | Stop reason: HOSPADM

## 2025-06-29 RX ORDER — IBUPROFEN 200 MG
16 TABLET ORAL
Status: DISCONTINUED | OUTPATIENT
Start: 2025-06-29 | End: 2025-07-02 | Stop reason: HOSPADM

## 2025-06-29 RX ORDER — METHYLPREDNISOLONE SOD SUCC 125 MG
125 VIAL (EA) INJECTION
Status: DISCONTINUED | OUTPATIENT
Start: 2025-06-29 | End: 2025-06-29

## 2025-06-29 RX ORDER — FOLIC ACID 1 MG/1
1000 TABLET ORAL DAILY
Status: DISCONTINUED | OUTPATIENT
Start: 2025-06-30 | End: 2025-07-02 | Stop reason: HOSPADM

## 2025-06-29 RX ORDER — TALC
6 POWDER (GRAM) TOPICAL NIGHTLY PRN
Status: DISCONTINUED | OUTPATIENT
Start: 2025-06-29 | End: 2025-07-02 | Stop reason: HOSPADM

## 2025-06-29 RX ORDER — IPRATROPIUM BROMIDE AND ALBUTEROL SULFATE 2.5; .5 MG/3ML; MG/3ML
3 SOLUTION RESPIRATORY (INHALATION)
Status: DISCONTINUED | OUTPATIENT
Start: 2025-06-30 | End: 2025-07-02 | Stop reason: HOSPADM

## 2025-06-29 RX ADMIN — MAGNESIUM SULFATE HEPTAHYDRATE 2 G: 40 INJECTION, SOLUTION INTRAVENOUS at 06:06

## 2025-06-29 RX ADMIN — AZITHROMYCIN MONOHYDRATE 500 MG: 500 INJECTION, POWDER, LYOPHILIZED, FOR SOLUTION INTRAVENOUS at 10:06

## 2025-06-29 RX ADMIN — CEFTRIAXONE SODIUM 1 G: 1 INJECTION, POWDER, FOR SOLUTION INTRAMUSCULAR; INTRAVENOUS at 10:06

## 2025-06-29 RX ADMIN — IPRATROPIUM BROMIDE AND ALBUTEROL SULFATE 3 ML: 2.5; .5 SOLUTION RESPIRATORY (INHALATION) at 06:06

## 2025-06-29 RX ADMIN — ALBUTEROL SULFATE 5 MG: 2.5 SOLUTION RESPIRATORY (INHALATION) at 07:06

## 2025-06-29 RX ADMIN — IOHEXOL 100 ML: 350 INJECTION, SOLUTION INTRAVENOUS at 08:06

## 2025-06-29 NOTE — ED PROVIDER NOTES
Encounter Date: 6/29/2025       History     Chief Complaint   Patient presents with    Cough     Pt in with a non productive cough with shortness of breath and body aches.  Pt O2 sat- 79% on room air.     Patient is a rather pleasant 85-year-old lady who lives at home and has a known history of CAD, status post CABG with aortic valve replacement, and remote history of cigarette smoking.  Patient states she was apparently well until about 1 week ago when she started developing cough which is said to be nonproductive and associated shortness of breath.  Patient states symptomatology seemed to persistent progressively worsen which had her concerned so she decided come to the emergency room to be evaluated.  Patient denies any fever, no chest pain, no abdominal pain, no sore throat, no nasal congestion.  Upon arrival to the emergency room patient's oxygen saturation is noted to be 79% on room air the patient is not on home oxygen.    The history is provided by the patient. No  was used.     Review of patient's allergies indicates:  No Known Allergies  Past Medical History:   Diagnosis Date    Arthritis     COPD (chronic obstructive pulmonary disease)     Emphysema of lung     Hyperlipidemia     Hypertension     Meniere's disease, unspecified ear     Thyroid disease      Past Surgical History:   Procedure Laterality Date    APPENDECTOMY      bladder lift N/A     CHOLECYSTECTOMY      triple bypass N/A     triple bypass and aortic valve replacemet  in 2018     Family History   Problem Relation Name Age of Onset    Heart disease Mother      Lung cancer Father       Social History[1]  Review of Systems   Constitutional:  Negative for activity change, appetite change and chills.   HENT:  Negative for congestion, ear pain, rhinorrhea, sinus pressure and sore throat.    Eyes:  Negative for redness and visual disturbance.   Respiratory:  Positive for cough, shortness of breath and wheezing.    Cardiovascular:   Negative for chest pain and palpitations.   Gastrointestinal:  Negative for abdominal pain, constipation, diarrhea, nausea and vomiting.   Genitourinary:  Negative for dysuria and vaginal discharge.   Musculoskeletal:  Negative for arthralgias and back pain.   Skin:  Negative for color change, pallor and rash.   Neurological:  Negative for dizziness, weakness, light-headedness and headaches.   Hematological:  Negative for adenopathy.   Psychiatric/Behavioral:  Negative for behavioral problems, self-injury and suicidal ideas.    All other systems reviewed and are negative.      Physical Exam     Initial Vitals [06/29/25 1804]   BP Pulse Resp Temp SpO2   (!) 119/53 109 (!) 30 100.3 °F (37.9 °C) (!) 90 %      MAP       --         Physical Exam    Nursing note and vitals reviewed.  Constitutional: She appears well-developed. She is not diaphoretic. She appears distressed.   Elderly, frail, ill-appearing, toxic appearing, warmth to touch, anicteric, no cyanosis, and in moderate respiratory distress   HENT:   Head: Normocephalic and atraumatic.   Right Ear: External ear normal.   Left Ear: External ear normal.   Nose: Nose normal. Mouth/Throat: Oropharynx is clear and moist. No oropharyngeal exudate.   Eyes: Conjunctivae and EOM are normal. Pupils are equal, round, and reactive to light. Right eye exhibits no discharge. Left eye exhibits no discharge. No scleral icterus.   Neck: Neck supple. No thyromegaly present. No tracheal deviation present.   Normal range of motion.  Cardiovascular:  Regular rhythm, normal heart sounds and intact distal pulses.           No murmur heard.  Tachycardia   Pulmonary/Chest: No respiratory distress. She has wheezes. She has no rhonchi. She has rales.   Bronchial breathing, reduced air exchange in the middle and lower lung fields bilaterally, appreciably use of accessory muscles of respiration   Abdominal: Abdomen is soft. She exhibits no mass. There is no abdominal tenderness. There is no  rebound and no guarding.   Musculoskeletal:         General: Normal range of motion.      Cervical back: Normal range of motion and neck supple.     Lymphadenopathy:     She has no cervical adenopathy.   Neurological: She is alert and oriented to person, place, and time.   Skin: Skin is warm and dry.   Psychiatric: She has a normal mood and affect. Thought content normal.         ED Course   Procedures  Labs Reviewed   B-TYPE NATRIURETIC PEPTIDE - Abnormal       Result Value    Natriuretic Peptide 106.8 (*)    COMPREHENSIVE METABOLIC PANEL - Abnormal    Sodium 140      Potassium 3.6      Chloride 107      CO2 22 (*)     Glucose 128 (*)     Blood Urea Nitrogen 15.4      Creatinine 0.71      Calcium 8.0 (*)     Protein Total 7.1      Albumin 3.5      Globulin 3.6 (*)     Albumin/Globulin Ratio 1.0 (*)     Bilirubin Total 0.8      ALP 51      ALT 25      AST 37      eGFR >60      Anion Gap 11.0      BUN/Creatinine Ratio 22     D DIMER, QUANTITATIVE - Abnormal    D-Dimer 0.79 (*)    PROTIME-INR - Abnormal    PT 14.9 (*)     INR 1.1      Narrative:     Protimes are used to monitor anticoagulant agents such as warfarin. PT INR values are based on the current patient normal mean and the AMANDA value for the specific instrument reagent used.  **Routine theraputic target values for the INR are 2.0-3.0**   CBC WITH DIFFERENTIAL - Abnormal    WBC 8.51      RBC 3.53 (*)     Hgb 11.8 (*)     Hct 35.0 (*)     MCV 99.2 (*)     MCH 33.4 (*)     MCHC 33.7      RDW 14.3      Platelet 179      MPV 9.8      Neut % 85.7      Lymph % 7.9      Mono % 6.0      Eos % 0.0      Basophil % 0.2      Imm Grans % 0.2      Neut # 7.29      Lymph # 0.67      Mono # 0.51      Eos # 0.00      Baso # 0.02      Imm Gran # 0.02      NRBC% 0.0     BLOOD GAS - Abnormal    Sample Type Arterial Blood      Sample site Left Radial Artery      Drawn by JWL      pH, Blood gas 7.490 (*)     pCO2, Blood gas 35.0      pO2, Blood gas 56.0 (*)     TOC2, Blood gas 27.8  (*)     Base Excess, Blood gas 3.40 (*)     sO2, Blood gas 91.6      HCO3, Blood gas 26.7 (*)     pAO2 106      A-aDO2 50      THb, Blood gas 11.0 (*)     O2 Hb, Blood Gas 88.9 (*)     CO Hgb 2.3 (*)     Met Hgb 0.6      Allens Test N/A      FIO2, Blood gas 21.0     TROPONIN I - Normal    Troponin-I 0.031     LACTIC ACID, PLASMA - Normal    Lactic Acid Level 1.6     BLOOD CULTURE [facility]   BLOOD CULTURE [facility]   CBC W/ AUTO DIFFERENTIAL    Narrative:     The following orders were created for panel order CBC Auto Differential.  Procedure                               Abnormality         Status                     ---------                               -----------         ------                     CBC with Differential[[id number]]       Abnormal            Final result                 Please view results for these tests on the individual orders.   EXTRA TUBES    Narrative:     The following orders were created for panel order EXTRA TUBES.  Procedure                               Abnormality         Status                     ---------                               -----------         ------                     Red Top Hold[[id number]]                                    Final result               Gold Top Hold[[id number]]                                   Final result                 Please view results for these tests on the individual orders.   RED TOP HOLD    Extra Tube Hold for add-ons.     GOLD TOP HOLD    Extra Tube Hold for add-ons.     URINALYSIS, REFLEX TO URINE CULTURE     EKG Readings: (Independently Interpreted)   ECG done at 7:52 p.m. interpreted by me.      Normal sinus rhythm with a ventricular rate of 106 beats per minute, prolonged MD interval, questionably positive voltage criteria for LVH, left axis deviation, no ST segment changes, normal T-waves, no new onset Q-waves, no bundle branch block, no delta waves       Imaging Results              CTA Chest Non-Coronary (PE Studies) (Preliminary result)   Result time 06/29/25 21:17:35      Preliminary result by Jace Piedra MD (06/29/25 21:17:35)                   Narrative:    START OF REPORT:  Technique: CT Scan of the chest was performed with intravenous contrast with direct axial images as well as sagittal and coronal reconstruction images pulmonary embolus protocol.    Dosage Information: Automated Exposure Control was utilized.    Comparison: Comparison is with study dated 2020-06-28 14:15:35.    Clinical History: Cough (Pt in with a non productive cough with shortness of breath and body aches. Pt O2 sat- 79% on room air.).    Findings:  Soft Tissues: The soft tissues of the chest wall appear unremarkable.  Lines and Tubes: None.  Neck: A hyperdense/calcified focus is again seen in the right thyroid lobe on image 14, series 4. Ultrasound correlation is suggested for further evaluation.  Mediastinum: An enlarged mediastinal lymph node is again seen in the precarinal space . This suggests reactive lymphadenopathy.  Heart: The heart appears unremarkable. The heart size is within normal limits. Mild coronary artery calcification is seen. The patient is status post median sternotomy.  Aorta: Moderate aortic calcification is seen in the arch and descending thoracic aorta.  Pulmonary Arteries: No filling defects are seen in the pulmonary arteries to suggest pulmonary embolus.  Lungs: Centrilobular emphysematous changes are again seen in both lungs, most pronounced in both upper lobes. Streaky linear opacity is seen predominantly in the dependent portions at the lung bases consistent with scarring and subsegmental atelectasis. There is some focal more prominent dependent opacity at the right-greater-than-left lung bases. This may reflect nonspecific dependent atelectasis with the possibility of an infectious component particularly at the right base where there are some air bronchograms not entirely excluded. Correlate with clinical and laboratory findings as  regards additional evaluation and follow-up.  Pleura: No effusions or pneumothorax are identified.  Bony Structures:  Spine: Multilevel spondylotic changes are seen in the thoracic spine.  Ribs: The visualized bilateral ribs appear unremarkable.  Abdomen: Surgical clips are seen in the right upper quadrant suggesting prior cholecystectomy.      Impression:  1. No filling defects are seen in the pulmonary arteries to suggest pulmonary embolus.  2. Centrilobular emphysematous changes are again seen in both lungs, most pronounced in both upper lobes. Streaky linear opacity is seen predominantly in the dependent portions at the lung bases consistent with scarring and subsegmental atelectasis. There is some focal more prominent dependent opacity at the right-greater-than-left lung bases. This may reflect nonspecific dependent atelectasis with the possibility of an infectious component particularly at the right base where there are some air bronchograms not entirely excluded. Correlate with clinical and laboratory findings as regards additional evaluation and follow-up.  3. Details and other findings as discussed above.                                         X-Ray Chest PA And Lateral (Final result)  Result time 06/29/25 18:58:02      Final result by Herminio Mansfield MD (06/29/25 18:58:02)                   Impression:      No acute findings in the chest      Electronically signed by: Herminio Mansfield MD  Date:    06/29/2025  Time:    18:58               Narrative:    EXAMINATION:  XR CHEST PA AND LATERAL    CLINICAL HISTORY:  Shortness of breath    COMPARISON:  06/28/2020    FINDINGS:  PA and lateral views of the chest show no focal consolidation, pneumothorax or pleural effusion.  Cardiac silhouette and pulmonary vasculature are normal.  Aorta is partially calcified.  No acute osseous findings.                                       Medications   azithromycin (ZITHROMAX) 500 mg in 0.9% NaCl 250 mL IVPB (admixture device)  (has no administration in time range)   albuterol nebulizer solution 5 mg (5 mg Nebulization Given 6/29/25 1915)   albuterol-ipratropium 2.5 mg-0.5 mg/3 mL nebulizer solution 3 mL (3 mLs Nebulization Given 6/29/25 1815)   magnesium sulfate 2g in water 50mL IVPB (premix) (0 g Intravenous Stopped 6/29/25 1904)   iohexoL (OMNIPAQUE 350) injection 100 mL (100 mLs Intravenous Given 6/29/25 2023)   cefTRIAXone injection 1 g (1 g Intravenous Given 6/29/25 2202)     Medical Decision Making  Here in the emergency room IV access is obtained and patient is given IV Solu-Medrol, IV magnesium, and a series of nebulizer treatments administered.  Patient has a cascade of labs drawn, urine sample obtained, chest x-ray ordered, an ECG ordered.  ECG returned showing no ST segment changes and chest x-ray shows no acute abnormalities.  Labs all returned relatively within normal limits except for D-dimer which is mildly elevated and BNP which is also elevated.  Due to this a CT angiogram of the chest is done which also returned showing no pulmonary embolism and no infiltrates but shows emphysematous changes.  These findings are reviewed with the patient and she verbalizes her understanding.  Patient is still noted to be short of but and given IV ceftriaxone and IV azithromycin which she tolerates and multiple attempts were made to wean the patient off oxygen which all returned futile.  Due to this Dr. Ingram in the internal medicine service is consulted and patient is endorsed to her and she accepts to admit patient to their service.  Labs, images, and clinical decision-making is discussed with the patient and she verbalizes her understanding and is agreeable with this.  Patient's care will be transferred with the treatment team of the internal medicine service and her condition upon admission is stable    Amount and/or Complexity of Data Reviewed  Labs: ordered.  Radiology: ordered.    Risk  Prescription drug management.  Decision  regarding hospitalization.                                      Clinical Impression:  Final diagnoses:  [R06.02] SOB (shortness of breath)  [J44.1] Acute exacerbation of chronic obstructive pulmonary disease (COPD) (Primary)       This chart is generated using a voice recognition system.  Grammatical and content areas may inadvertently be generated in error.  Please contact me if you find a perceive any inappropriate information in this chart.  Thank you.   ED Disposition Condition    Admit                     Gokul Davison MD  25         [1]   Social History  Tobacco Use    Smoking status: Former     Current packs/day: 0.00     Types: Cigarettes     Quit date:      Years since quittin.5    Smokeless tobacco: Never   Vaping Use    Vaping status: Never Used   Substance Use Topics    Alcohol use: Not Currently    Drug use: Never        Gokul Davison MD  [phone]

## 2025-06-30 LAB
ALBUMIN SERPL-MCNC: 3.3 G/DL (ref 3.4–4.8)
ALBUMIN/GLOB SERPL: 0.9 RATIO (ref 1.1–2)
ALP SERPL-CCNC: 49 UNIT/L (ref 40–150)
ALT SERPL-CCNC: 24 UNIT/L (ref 0–55)
ANION GAP SERPL CALC-SCNC: 9 MEQ/L
AST SERPL-CCNC: 30 UNIT/L (ref 11–45)
B PERT.PT PRMT NPH QL NAA+NON-PROBE: NOT DETECTED
BASOPHILS # BLD AUTO: 0.01 X10(3)/MCL
BASOPHILS NFR BLD AUTO: 0.1 %
BILIRUB SERPL-MCNC: 0.6 MG/DL
BUN SERPL-MCNC: 17.2 MG/DL (ref 9.8–20.1)
C PNEUM DNA NPH QL NAA+NON-PROBE: NOT DETECTED
CALCIUM SERPL-MCNC: 7.8 MG/DL (ref 8.4–10.2)
CHLORIDE SERPL-SCNC: 107 MMOL/L (ref 98–107)
CO2 SERPL-SCNC: 24 MMOL/L (ref 23–31)
CREAT SERPL-MCNC: 0.71 MG/DL (ref 0.55–1.02)
CREAT/UREA NIT SERPL: 24
EOSINOPHIL # BLD AUTO: 0 X10(3)/MCL (ref 0–0.9)
EOSINOPHIL NFR BLD AUTO: 0 %
ERYTHROCYTE [DISTWIDTH] IN BLOOD BY AUTOMATED COUNT: 14.4 % (ref 11.5–17)
FLUAV AG UPPER RESP QL IA.RAPID: NOT DETECTED
FLUBV AG UPPER RESP QL IA.RAPID: NOT DETECTED
GFR SERPLBLD CREATININE-BSD FMLA CKD-EPI: >60 ML/MIN/1.73/M2
GLOBULIN SER-MCNC: 3.7 GM/DL (ref 2.4–3.5)
GLUCOSE SERPL-MCNC: 122 MG/DL (ref 82–115)
HADV DNA NPH QL NAA+NON-PROBE: NOT DETECTED
HCOV 229E RNA NPH QL NAA+NON-PROBE: NOT DETECTED
HCOV HKU1 RNA NPH QL NAA+NON-PROBE: NOT DETECTED
HCOV NL63 RNA NPH QL NAA+NON-PROBE: NOT DETECTED
HCOV OC43 RNA NPH QL NAA+NON-PROBE: NOT DETECTED
HCT VFR BLD AUTO: 34.1 % (ref 37–47)
HGB BLD-MCNC: 11.5 G/DL (ref 12–16)
HMPV RNA NPH QL NAA+NON-PROBE: NOT DETECTED
HOLD SPECIMEN: NORMAL
HPIV1 RNA NPH QL NAA+NON-PROBE: NOT DETECTED
HPIV2 RNA NPH QL NAA+NON-PROBE: NOT DETECTED
HPIV3 RNA NPH QL NAA+NON-PROBE: DETECTED
HPIV4 RNA NPH QL NAA+NON-PROBE: NOT DETECTED
IMM GRANULOCYTES # BLD AUTO: 0.04 X10(3)/MCL (ref 0–0.04)
IMM GRANULOCYTES NFR BLD AUTO: 0.4 %
LYMPHOCYTES # BLD AUTO: 0.6 X10(3)/MCL (ref 0.6–4.6)
LYMPHOCYTES NFR BLD AUTO: 6.6 %
M PNEUMO DNA NPH QL NAA+NON-PROBE: NOT DETECTED
MCH RBC QN AUTO: 33.4 PG (ref 27–31)
MCHC RBC AUTO-ENTMCNC: 33.7 G/DL (ref 33–36)
MCV RBC AUTO: 99.1 FL (ref 80–94)
MONOCYTES # BLD AUTO: 0.49 X10(3)/MCL (ref 0.1–1.3)
MONOCYTES NFR BLD AUTO: 5.4 %
NEUTROPHILS # BLD AUTO: 7.95 X10(3)/MCL (ref 2.1–9.2)
NEUTROPHILS NFR BLD AUTO: 87.5 %
NRBC BLD AUTO-RTO: 0 %
OHS QRS DURATION: 82 MS
OHS QTC CALCULATION: 452 MS
PLATELET # BLD AUTO: 193 X10(3)/MCL (ref 130–400)
PMV BLD AUTO: 10 FL (ref 7.4–10.4)
POTASSIUM SERPL-SCNC: 3.5 MMOL/L (ref 3.5–5.1)
PROT SERPL-MCNC: 7 GM/DL (ref 5.8–7.6)
RBC # BLD AUTO: 3.44 X10(6)/MCL (ref 4.2–5.4)
RSV A 5' UTR RNA NPH QL NAA+PROBE: NOT DETECTED
RSV RNA NPH QL NAA+NON-PROBE: NOT DETECTED
RV+EV RNA NPH QL NAA+NON-PROBE: NOT DETECTED
SARS-COV-2 RNA RESP QL NAA+PROBE: NOT DETECTED
SODIUM SERPL-SCNC: 140 MMOL/L (ref 136–145)
WBC # BLD AUTO: 9.09 X10(3)/MCL (ref 4.5–11.5)

## 2025-06-30 PROCEDURE — 25000003 PHARM REV CODE 250

## 2025-06-30 PROCEDURE — 94761 N-INVAS EAR/PLS OXIMETRY MLT: CPT

## 2025-06-30 PROCEDURE — 80053 COMPREHEN METABOLIC PANEL: CPT

## 2025-06-30 PROCEDURE — 96372 THER/PROPH/DIAG INJ SC/IM: CPT

## 2025-06-30 PROCEDURE — 85025 COMPLETE CBC W/AUTO DIFF WBC: CPT

## 2025-06-30 PROCEDURE — 87070 CULTURE OTHR SPECIMN AEROBIC: CPT

## 2025-06-30 PROCEDURE — G0378 HOSPITAL OBSERVATION PER HR: HCPCS

## 2025-06-30 PROCEDURE — 97162 PT EVAL MOD COMPLEX 30 MIN: CPT

## 2025-06-30 PROCEDURE — 27000221 HC OXYGEN, UP TO 24 HOURS

## 2025-06-30 PROCEDURE — 0241U COVID/RSV/FLU A&B PCR: CPT | Performed by: INTERNAL MEDICINE

## 2025-06-30 PROCEDURE — 25000242 PHARM REV CODE 250 ALT 637 W/ HCPCS

## 2025-06-30 PROCEDURE — 96375 TX/PRO/DX INJ NEW DRUG ADDON: CPT

## 2025-06-30 PROCEDURE — 87486 CHLMYD PNEUM DNA AMP PROBE: CPT | Performed by: INTERNAL MEDICINE

## 2025-06-30 PROCEDURE — 96367 TX/PROPH/DG ADDL SEQ IV INF: CPT

## 2025-06-30 PROCEDURE — 63600175 PHARM REV CODE 636 W HCPCS

## 2025-06-30 PROCEDURE — 96376 TX/PRO/DX INJ SAME DRUG ADON: CPT

## 2025-06-30 PROCEDURE — 36415 COLL VENOUS BLD VENIPUNCTURE: CPT

## 2025-06-30 PROCEDURE — 63600175 PHARM REV CODE 636 W HCPCS: Mod: JZ,TB

## 2025-06-30 PROCEDURE — 94640 AIRWAY INHALATION TREATMENT: CPT | Mod: XB

## 2025-06-30 RX ORDER — PREDNISONE 20 MG/1
40 TABLET ORAL DAILY
Status: DISCONTINUED | OUTPATIENT
Start: 2025-06-30 | End: 2025-06-30

## 2025-06-30 RX ORDER — SODIUM CHLORIDE FOR INHALATION 3 %
4 VIAL, NEBULIZER (ML) INHALATION ONCE
Status: COMPLETED | OUTPATIENT
Start: 2025-06-30 | End: 2025-06-30

## 2025-06-30 RX ORDER — GUAIFENESIN 600 MG/1
600 TABLET, EXTENDED RELEASE ORAL 2 TIMES DAILY PRN
Status: DISCONTINUED | OUTPATIENT
Start: 2025-06-30 | End: 2025-07-01

## 2025-06-30 RX ORDER — ATORVASTATIN CALCIUM 40 MG/1
80 TABLET, FILM COATED ORAL NIGHTLY
Status: DISCONTINUED | OUTPATIENT
Start: 2025-06-30 | End: 2025-07-02 | Stop reason: HOSPADM

## 2025-06-30 RX ORDER — ENOXAPARIN SODIUM 100 MG/ML
40 INJECTION SUBCUTANEOUS EVERY 24 HOURS
Status: DISCONTINUED | OUTPATIENT
Start: 2025-06-30 | End: 2025-07-02 | Stop reason: HOSPADM

## 2025-06-30 RX ADMIN — ENOXAPARIN SODIUM 40 MG: 40 INJECTION SUBCUTANEOUS at 06:06

## 2025-06-30 RX ADMIN — IPRATROPIUM BROMIDE AND ALBUTEROL SULFATE 3 ML: 2.5; .5 SOLUTION RESPIRATORY (INHALATION) at 07:06

## 2025-06-30 RX ADMIN — IPRATROPIUM BROMIDE AND ALBUTEROL SULFATE 3 ML: 2.5; .5 SOLUTION RESPIRATORY (INHALATION) at 11:06

## 2025-06-30 RX ADMIN — ATORVASTATIN CALCIUM 80 MG: 40 TABLET, FILM COATED ORAL at 09:06

## 2025-06-30 RX ADMIN — GUAIFENESIN 600 MG: 600 TABLET, EXTENDED RELEASE ORAL at 11:06

## 2025-06-30 RX ADMIN — CEFTRIAXONE SODIUM 1 G: 1 INJECTION, POWDER, FOR SOLUTION INTRAMUSCULAR; INTRAVENOUS at 10:06

## 2025-06-30 RX ADMIN — FLUOXETINE HYDROCHLORIDE 40 MG: 20 CAPSULE ORAL at 09:06

## 2025-06-30 RX ADMIN — PANTOPRAZOLE SODIUM 40 MG: 40 TABLET, DELAYED RELEASE ORAL at 09:06

## 2025-06-30 RX ADMIN — IPRATROPIUM BROMIDE AND ALBUTEROL SULFATE 3 ML: 2.5; .5 SOLUTION RESPIRATORY (INHALATION) at 03:06

## 2025-06-30 RX ADMIN — AZITHROMYCIN MONOHYDRATE 500 MG: 500 INJECTION, POWDER, LYOPHILIZED, FOR SOLUTION INTRAVENOUS at 09:06

## 2025-06-30 RX ADMIN — LEVOTHYROXINE SODIUM 75 MCG: 0.05 TABLET ORAL at 06:06

## 2025-06-30 RX ADMIN — METHYLPREDNISOLONE SODIUM SUCCINATE 60 MG: 40 INJECTION, POWDER, FOR SOLUTION INTRAMUSCULAR; INTRAVENOUS at 11:06

## 2025-06-30 RX ADMIN — SODIUM CHLORIDE 30 MG/ML INHALATION SOLUTION 4 ML: 30 SOLUTION INHALANT at 09:06

## 2025-06-30 RX ADMIN — FOLIC ACID 1000 MCG: 1 TABLET ORAL at 09:06

## 2025-06-30 RX ADMIN — METHYLPREDNISOLONE SODIUM SUCCINATE 60 MG: 40 INJECTION, POWDER, FOR SOLUTION INTRAMUSCULAR; INTRAVENOUS at 10:06

## 2025-06-30 NOTE — PLAN OF CARE
Problem: Physical Therapy  Goal: Physical Therapy Goal  Description: Goals to be met by: Discharge      Patient will increase functional independence with mobility by performin. Sit to stand transfer with Modified [place]  2. Bed to chair transfer with Modified [place] using Rolling Walker  3. Gait  x  at least 50 feet at MOD I and up to 130 feet with Modified [place] using Rolling Walker; O2 saturation >88% on room air.      Outcome: Progressing

## 2025-06-30 NOTE — PLAN OF CARE
Problem: Adult Inpatient Plan of Care  Goal: Plan of Care Review  6/29/2025 2338 by Taya Torres RN  Outcome: Progressing  6/29/2025 2338 by Taya Torres RN  Outcome: Progressing  Goal: Patient-Specific Goal (Individualized)  6/29/2025 2338 by Taya Torres RN  Outcome: Progressing  6/29/2025 2338 by Taya Torres RN  Outcome: Progressing  Goal: Absence of Hospital-Acquired Illness or Injury  6/29/2025 2338 by Taya Torres RN  Outcome: Progressing  6/29/2025 2338 by Taya Torres RN  Outcome: Progressing  Goal: Optimal Comfort and Wellbeing  6/29/2025 2338 by Taya Torres RN  Outcome: Progressing  6/29/2025 2338 by Taya Torres RN  Outcome: Progressing  Goal: Readiness for Transition of Care  6/29/2025 2338 by Taya Torres RN  Outcome: Progressing  6/29/2025 2338 by Taya Torres RN  Outcome: Progressing

## 2025-06-30 NOTE — H&P
[facility] Medicine  History & Physical      Patient Name: Ginette Mcgovern  : 1940  MRN: [id number]  Patient Class: OP- Observation   Admission Date: 2025   Length of Stay: 0  Admitting Service: Hospital Medicine  Attending Physician: Akosua White MD  PCP: Christiano Kaba MD  Source of history: Patient, patient's family, and EMR.   Code status: Partial Code    Chief Complaint   Cough (Pt in with a non productive cough with shortness of breath and body aches.  Pt O2 sat- 79% on room air.)    History of Present Illness   85 y.o. female with  has a past medical history of Arthritis, COPD (chronic obstructive pulmonary disease), Emphysema of lung, Hyperlipidemia, Hypertension, Meniere's disease, unspecified ear, and Thyroid disease. presents to ED for worsening cough. Patients states onset was Monday (2025) when she was experiencing sore throat, cough, nasal congestion, and nausea, but no fever. She went to [facility] the next day and was diagnoses with URI. No antibiotic was prescribed. She states improvement of sore throat and nasal congestion. However, coughing gradually worsening that she could not tolerate. No sputum produced. She adds mild SOB and nauseated without vomiting. She adds no home oxygen and adherents to Stiolto Respimat and Breztri Aerosphere inhalers for COPD management at home. She denies headache, CP, abdominal pain, decreased appetite, diarrhea/constipation, weakness, depression and anxiety.     In ED, upon arrival, T 100.3, , RR 30, /53, SpO2 90% on N/C. Labs showed WBC 8.51, H/H 11.8/35, PT 14.9, D-dimer 0.79, CO 22, Glc 128, corrected Ca 10.4, and .8. ABG revealed pH 7.49, PO2 56, and HCO3 26.7. CT chest showed no pulmonary embolus indicated. But there are centrilobular emphysematous changes. CXT showed no acute findings. She was given albuterol nebulizer, Duo-Nebs, Zithromax, Ceftriaxone, magnesium sulfate and  methylprednisolone.     Admitted to service under observation for further management of COPD exacerbation.     ROS   Pertinent positive and negative as mentioned in HPI   ROS  Past Medical History     Past Medical History:   Diagnosis Date    Arthritis     COPD (chronic obstructive pulmonary disease)     Emphysema of lung     Hyperlipidemia     Hypertension     Meniere's disease, unspecified ear     Thyroid disease      Past Surgical History     Past Surgical History:   Procedure Laterality Date    APPENDECTOMY      bladder lift N/A     CHOLECYSTECTOMY      triple bypass N/A     triple bypass and aortic valve replacemet  in 2018     Social History     Social History     Tobacco Use    Smoking status: Former     Current packs/day: 0.00     Types: Cigarettes     Quit date:      Years since quittin.5    Smokeless tobacco: Never   Substance Use Topics    Alcohol use: Not Currently      Family History   Reviewed and noncontributory    Allergies   Patient has no known allergies.  Home Medications     Prior to Admission medications    Medication Sig Start Date End Date Taking? Authorizing Provider   ROSMERY MOCTEZUMA 160-9-4.8 mcg/actuation HFAA Inhale 1 puff into the lungs 3 (three) times daily as needed. 3/7/23  Yes Provider, Historical   denosumab (PROLIA) 60 mg/mL Syrg Inject 60 mg into the skin every 6 (six) months. 22  Yes Provider, Historical   ezetimibe (ZETIA) 10 mg tablet Take 1 tablet by mouth once daily. 4/10/23  Yes Provider, Historical   FLUoxetine 40 MG capsule Take 1 capsule by mouth once daily. 4/10/23  Yes Provider, Historical   fluticasone propionate (FLOVENT DISKUS) 50 mcg/actuation DsDv Inhale 1 puff into the lungs. 22  Yes Provider, Historical   folic acid (FOLVITE) 1 MG tablet TAKE ONE TABLET BY MOUTH ONCE A DAY 10/10/23  Yes Prasanth Mitchell MD   levocetirizine (XYZAL) 5 MG tablet Take 1 tablet by mouth once daily. 22  Yes Provider, Historical   levothyroxine (SYNTHROID)  75 MCG tablet Take 1 tablet by mouth once daily.   Yes Provider, Historical   montelukast (SINGULAIR) 10 mg tablet Take 10 mg by mouth once daily. 9/29/22  Yes Provider, Historical   omeprazole (PRILOSEC) 20 MG capsule Take 1 capsule by mouth once daily. 9/29/22  Yes Provider, Historical   rosuvastatin (CRESTOR) 40 MG Tab Take 1 tablet by mouth once daily. 4/10/23  Yes Provider, Historical   simvastatin (ZOCOR) 40 MG tablet Take 40 mg by mouth once daily. 9/29/22  Yes Provider, Historical   tiotropium-olodateroL (STIOLTO RESPIMAT) 2.5-2.5 mcg/actuation Mist Inhale 2 puffs into the lungs once daily. 9/29/22  Yes Provider, Historical   amoxicillin (AMOXIL) 875 MG tablet Take 1 tablet by mouth every 12 (twelve) hours.    Provider, Historical   EScitalopram oxalate (LEXAPRO) 20 MG tablet Take 20 mg by mouth once daily. 9/29/22   Provider, Historical   methotrexate 2.5 MG Tab Take 6 tablets (15 mg total) by mouth every 7 days. EVERY    THURSDAY    TAKE 3 TAB AFTER LUNCH AND 3 TAB AFTER SUPPER 8/18/23 2/14/24  Prasanth Mitchell MD   metoprolol succinate (TOPROL-XL) 25 MG 24 hr tablet Take 25 mg by mouth once daily. 6/9/23   Provider, Historical   tiZANidine (ZANAFLEX) 2 MG tablet Take 1 tablet (2 mg total) by mouth nightly. 8/18/23   Prasanth Mitchell MD      Inpatient Medications   Scheduled Meds   [START ON 6/30/2025] albuterol-ipratropium  3 mL Nebulization Q4H WAKE    atorvastatin  80 mg Oral QHS    azithromycin  500 mg Intravenous ED 1 Time    [START ON 6/30/2025] azithromycin  500 mg Intravenous Q24H    [START ON 6/30/2025] cefTRIAXone (Rocephin) IV (PEDS and ADULTS)  1 g Intravenous Q24H    enoxparin  40 mg Subcutaneous Daily    [START ON 6/30/2025] FLUoxetine  40 mg Oral Daily    [START ON 6/30/2025] folic acid  1,000 mcg Oral Daily    [START ON 6/30/2025] levothyroxine  75 mcg Oral Daily    [START ON 6/30/2025] methylPREDNISolone injection (PEDS and ADULTS)  60 mg Intravenous Q12H    [START ON 6/30/2025]  pantoprazole  40 mg Oral Daily     Continuous Infusions    PRN Meds    Current Facility-Administered Medications:     acetaminophen, 650 mg, Oral, Q4H PRN    dextrose 50%, 12.5 g, Intravenous, PRN    dextrose 50%, 25 g, Intravenous, PRN    glucagon (human recombinant), 1 mg, Intramuscular, PRN    glucose, 16 g, Oral, PRN    glucose, 24 g, Oral, PRN    melatonin, 6 mg, Oral, Nightly PRN    naloxone, 0.02 mg, Intravenous, PRN    ondansetron, 8 mg, Oral, Q8H PRN    sodium chloride 0.9%, 10 mL, Intravenous, Q12H PRN    Physical Exam   Vital Signs  Temp:  [100.3 °F (37.9 °C)]   Pulse:  []   Resp:  [20-30]   BP: (105-121)/(53-64)   SpO2:  [85 %-95 %]       Physical Exam  Vitals reviewed.   Constitutional:       General: She is not in acute distress.  HENT:      Head: Normocephalic and atraumatic.      Nose: Nose normal. No congestion or rhinorrhea.      Mouth/Throat:      Pharynx: Oropharynx is clear. No oropharyngeal exudate or posterior oropharyngeal erythema.   Eyes:      Extraocular Movements: Extraocular movements intact.      Conjunctiva/sclera: Conjunctivae normal.   Cardiovascular:      Rate and Rhythm: Normal rate and regular rhythm.      Pulses: Normal pulses.      Heart sounds: Normal heart sounds. No murmur heard.  Pulmonary:      Breath sounds: Normal breath sounds. No wheezing.      Comments: 2L N/C  Abdominal:      General: Bowel sounds are normal.      Palpations: Abdomen is soft.      Tenderness: There is no abdominal tenderness.   Musculoskeletal:         General: No swelling or tenderness.      Cervical back: Normal range of motion and neck supple.   Skin:     General: Skin is warm and dry.      Capillary Refill: Capillary refill takes less than 2 seconds.   Neurological:      General: No focal deficit present.      Mental Status: She is alert and oriented to person, place, and time.   Psychiatric:         Mood and Affect: Mood normal.         Thought Content: Thought content normal.          Labs  "  I have reviewed the following results below:  CBC  Recent Labs     06/29/25 1827   WBC 8.51   RBC 3.53*   HGB 11.8*   HCT 35.0*   MCV 99.2*   MCH 33.4*   MCHC 33.7   RDW 14.3        Anemia  No results for input(s): "HAPTOGLOBIN", "FERRITIN", "IRON", "TIBC" in the last 72 hours.  Coags  Recent Labs     06/29/25 1827   INR 1.1   D-DIMER 0.79*     Cardiac  Recent Labs     06/29/25 1827   .8*   TROPONINI 0.031     ABG/Lactate  Recent Labs     06/29/25 1831   PH 7.490*   PCO2 35.0   PO2 56.0*   HCO3 26.7*      Urinalysis  No results for input(s): "COLORUA", "APPEARANCEUA", "SGUA", "PHUA", "PROTEINUA", "GLUCOSEUA", "KETONESUA", "BLOODUA", "UROBILINOGEN", "NITRITESUA", "LEUKOCYTESUR" in the last 72 hours.   BMP  Recent Labs     06/29/25 1827      K 3.6   CO2 22*   BUN 15.4   CREATININE 0.71   CALCIUM 8.0*     LFTs  Recent Labs     06/29/25 1827   ALBUMIN 3.5   GLOBULIN 3.6*   ALKPHOS 51   ALT 25   AST 37   BILITOT 0.8     Inflammatory Markers  No results for input(s): "CRP", "LDH", "ESR", "HSCRP", "SEDRATE" in the last 72 hours.  Lipid  No results for input(s): "CHOL", "TRIG", "LDL", "VLDL", "HDL" in the last 72 hours.  Diabetes  No results for input(s): "HGBA1C", "GLUCOSE", "MICALBCREAT" in the last 72 hours.  Thyroid  No results for input(s): "TSH", "FREET4" in the last 72 hours.       Microbiology Results (last 7 days)       Procedure Component Value Units Date/Time    Blood Culture [[id number]] Collected: 06/29/25 1827    Order Status: Resulted Specimen: Blood from Antecubital, Right Updated: 06/29/25 1833    Blood Culture [[id number]] Collected: 06/29/25 1827    Order Status: Resulted Specimen: Blood from Hand, Right Updated: 06/29/25 1833           Imaging     CTA Chest Non-Coronary (PE Studies)         X-Ray Chest PA And Lateral   Final Result      No acute findings in the chest         Electronically signed by: Herminio Mansfield MD   Date:    06/29/2025   Time:    18:58        Assessment & " Plan     COPD exacerbation, likely triggered by recent URI  RR 30 and SpO2 90% with 2L N/C on admission   Increased D-dimer, but negative pulmonary embolus to r/o PE  Continue Duo-Nebs q4h while awake  Continue Zithromax 500 mg IV and Ceftriaxone 1 g IV daily  Initiate Solu-Medrol 60 mg IV bid   Continue O2   IS  Respiratory panel, COVID/RSV/FLU pending    Hx of HLD  Atorvastatin 80 mg PO qhs    Hx of fibromyalgia  Continue home fluoxetine 40 mg PO daily    Anemia, chronic   H/H 11.8/35 on admission (baseline)  Continue home folic acid 1000 mcg PO daily    Hx of hypothyroidism   Continue home levothyroxine 75 mcg PO daily      CODE STATUS: Partial Code  Access: Peripheral  Antibiotics: Zithromax and Ceftriaxone   Diet: Cardiac  DVT Prophylaxis: Lovenox  GI Prophylaxis: proton pump inhibitor   Fluids: none    Shin Hatch MD  [facility], [facility], -  06/29/2025

## 2025-06-30 NOTE — PT/OT/SLP EVAL
Physical Therapy Evaluation    Patient Name:  Ginette Mcgovern   MRN:  [id number]    Recommendations:     Therapy Intensity Recommendations at Discharge: Low Intensity Therapy  Discharge Equipment Recommendations: none, other (see comments) (Possible O2 pending sturation - refer to Respiratory)   Equipment to be obtained for discharge: none.  Barriers to discharge: time since onset and medical diagnosis    Assessment:     Ginette Mcgovern is a 85 y.o. female admitted with a medical diagnosis of COPD exacerbation.  1. Acute exacerbation of chronic obstructive pulmonary disease (COPD)    2. SOB (shortness of breath)    3. Chest pain       Problem List[1]   She presents with the following impairments/functional limitations:  impaired endurance, impaired cardiopulmonary response to activity, gait instability.    Rehab Prognosis: Good.    Patient would benefit from continued skilled acute PT services to: address above listed impairments/functional limitations; receive patient/caregiver education; reduce fall risk; and maximize independency/safety with functional mobility.    Recent Surgery: * No surgery found *      Plan:     During this hospitalization, patient to be seen 5 x/week to address the identified impairments/functional limitations via gait training, therapeutic activities, therapeutic exercises and progress toward the established goals.    Plan of Care Expires:  07/28/25    Subjective     Communicated with patient's nurse Saira prior to session.    Patient agreeable to participate in evaluation.     Chief Complaint: Coughing and fatigue  Patient/Family Comments/goals: Get better and go home  Pain/Comfort:  Pain Rating 1: 0/10  Pain Rating Post-Intervention 1: 0/10    Patients cultural, spiritual, Congregation conflicts given the current situation: no    Social History  Living Environment: Patient lives alone in a single level home, with no steps, with a son that lives next door.  Functional Level: Prior to admission  patient was driving, ambulated without assistive device, was independent in ADL's, and was independent in IADL's.  Equipment Used at Home: cane, straight, rollator  Equipment owned (not currently used): none.  Assistance Upon Discharge: family.      Objective:     Patient found with HOB elevated with telemetry, oxygen, PureWick  upon PT entry to room.    General Precautions: Standard, fall , [place]  Orthopedic Precautions:N/A   Braces:  N/A  Respiratory Status: 2.5 liters/min O2 via nasal cannula    Vitals   At Rest (pre-session)  BP  109/54   HR     O2 Sat %  95 on 2.5L via NC      With Activity (post-session)  BP     HR     O2 Sat %  85% on room air     Exams:  Orientation: Patient is oriented to person, place, time, situation  Commands: Patient follows commands consistently    RLE ROM: WNL  RLE Strength: WNL  LLE ROM: WNL  LLE Strength: WNL    Functional Mobility:    Bed Mobility:  Rolling Left: modified independence  Rolling Right: modified independence  Supine to Sit: modified independence  Sit to Supine: modified independence    Transfers:  Sit to Stand: stand by assistance with rolling walker  Stand to Sit: stand by assistance with rolling walker  Toilet Transfer: stand by assistance with rolling walker using Stand Pivot  Global shaking/tremor noted with all transfers - possibly due to recent breathing tx.     Gait:  Patient ambulated 2 x 20 ft in room with rolling walker and stand by assistance and contact guard assistance.  Patient demonstrates :       occasional unsteady gait       decreased aramis       flexed posture.    Other Mobility:  Therapeutic Activities performed:        -standing balance activities:              weight shifting:                   -forward                 -laterally (left)                 -laterally (right)            reaching:                   -forward                 -diagonal (left)                 -diagonal (right)  All for bathroom and hygiene tasks; PT providing SBA and pt  was able to complete donning/doffing brief on her own.     Balance:  Sit  Static: FAIR+: Able to take MINIMAL challenges from all directions  Dynamic: FAIR+: Maintains balance through MINIMAL excursions of active trunk motion  Stand  Static: FAIR+: Takes MINIMAL challenges from all directions  Dynamic: FAIR: Needs CONTACT GUARD during gait    Additional Treatment Session  N/A    Patient left with HOB elevated with all lines intact, call button in reach, tray table at bedside, bedside commode at bedside, patient's nurse notified, and family and respiratory  present.    DME Justifications:  No DME recommended requiring DME justifications    Education     Patient educated on the importance of early mobility to prevent functional decline during hospital stay.  Patient educated on and assisted with functional mobility as noted above.  Patient educated on PT Plan of Care and role of PT in acute care.  Patient was instructed to utilize staff assistance for mobility/transfers.  White board updated regarding patient's safest level of mobility with staff assistance    Goals     Multidisciplinary Problems       Physical Therapy Goals          Problem: Physical Therapy    Goal Priority Disciplines Outcome Interventions   Physical Therapy Goal     PT, PT/OT Progressing    Description: Goals to be met by: Discharge      Patient will increase functional independence with mobility by performin. Sit to stand transfer with Modified [place]  2. Bed to chair transfer with Modified [place] using Rolling Walker  3. Gait  x  at least 50 feet at MOD I and up to 130 feet with Modified [place] using Rolling Walker; O2 saturation >88% on room air.                         History:     Past Medical History:   Diagnosis Date    Arthritis     COPD (chronic obstructive pulmonary disease)     Emphysema of lung     Hyperlipidemia     Hypertension     Meniere's disease, unspecified ear     Thyroid disease      Past Surgical  History:   Procedure Laterality Date    APPENDECTOMY      bladder lift N/A     CHOLECYSTECTOMY      triple bypass N/A     triple bypass and aortic valve replacemet  in 2018     Time Tracking:     PT Received On: 06/30/25  PT Start Time: 1105     PT Stop Time: 1128  PT Total Time (min): 23 min     Billable Minutes: Evaluation 23; moderate complexity     06/30/2025       [1]   Patient Active Problem List  Diagnosis    Rheumatoid arthritis    Fibromyalgia syndrome    Insomnia    Osteoporosis    COPD exacerbation

## 2025-06-30 NOTE — PROGRESS NOTES
Inpatient Nutrition Assessment    Admit Date: 6/29/2025   Total duration of encounter: 1 day   Patient Age: 85 y.o.    Nutrition Recommendation/Prescription     Regular, No added salt diet  Boost (provides 240 kcal, 10 g protein per serving) BID  Monitor Weight Weekly     Communication of Recommendations: reviewed with nurse, reviewed with patient, and reviewed with family    Nutrition Assessment     Malnutrition Assessment/Nutrition-Focused Physical Exam    Malnutrition Context: acute illness or injury (06/30/25 1213)  Malnutrition Level: other (see comments) (Does not meet criteria) (06/30/25 1213)  Energy Intake (Malnutrition): other (see comments) (Does not meet criteria) (06/30/25 1213)  Weight Loss (Malnutrition): other (see comments) (Does not meet criteria) (06/30/25 1213)     Or[place] Region (Subcutaneous Fat Loss): well nourished           [place] (Muscle Loss): well nourished                       Fluid Accumulation (Malnutrition): other (see comments) (Not present) (06/30/25 1213)        A minimum of two characteristics is recommended for diagnosis of either severe or non-severe malnutrition.    Chart Review    Reason Seen: malnutrition screening tool (MST)    Malnutrition Screening Tool Results   Have you recently lost weight without trying?: Yes: 2-13 lbs  Have you been eating poorly because of a decreased appetite?: Yes   MST Score: 2   Diagnosis:  Hypoxemic respiratory failure 2/2 COPD exacerbation, likely triggered by recent URI   Hx of HLD   Hx of fibromyalgia   Anemia, chronic   Hx of hypothyroidism     Relevant Medical History: Arthritis, COPD, Emphysema of lung, HLD, HTN, Meniere's diease, Thyroid disease    Scheduled Medications:  albuterol-ipratropium, 3 mL, Q4H WAKE  atorvastatin, 80 mg, QHS  azithromycin, 500 mg, Q24H  cefTRIAXone (Rocephin) IV (PEDS and ADULTS), 1 g, Q24H  enoxparin, 40 mg, Q24H (prophylaxis, 1700)  FLUoxetine, 40 mg, Daily  folic acid, 1,000 mcg, Daily  levothyroxine,  "75 mcg, Daily  methylPREDNISolone injection (PEDS and ADULTS), 60 mg, Q12H  pantoprazole, 40 mg, Daily    Continuous Infusions:   PRN Medications:  acetaminophen, 650 mg, Q4H PRN  dextrose 50%, 12.5 g, PRN  dextrose 50%, 25 g, PRN  glucagon (human recombinant), 1 mg, PRN  glucose, 16 g, PRN  glucose, 24 g, PRN  melatonin, 6 mg, Nightly PRN  naloxone, 0.02 mg, PRN  ondansetron, 8 mg, Q8H PRN  sodium chloride 0.9%, 10 mL, Q12H PRN    Calorie Containing IV Medications: no significant kcals from medications at this time    Recent Labs   Lab 06/29/25  1827 06/30/25  0518    140   K 3.6 3.5   CALCIUM 8.0* 7.8*    107   CO2 22* 24   BUN 15.4 17.2   CREATININE 0.71 0.71   EGFRNORACEVR >60 >60   * 122*   BILITOT 0.8 0.6   ALKPHOS 51 49   ALT 25 24   AST 37 30   ALBUMIN 3.5 3.3*   WBC 8.51 9.09   HGB 11.8* 11.5*   HCT 35.0* 34.1*     Nutrition Orders:  Diet Heart Healthy  Dietary nutrition supplements BID; Boost Original Nutritional Drink - Vanilla    Appetite/Oral Intake: good/% of meals  Factors Affecting Nutritional Intake: none identified  Social Needs Impacting Access to Food: none identified  Food/Confucianist/Cultural Preferences: none reported  Food Allergies: no known food allergies  Last Bowel Movement: 06/28/25  Wound(s):  skin intact    Comments    6/30/25 -- Pt with good po intake of breakfast this am; denies difficulty chewing or swallowing; no n/v/d/c; daughter at bedside reports pt eating fairly PTA consisting of bagel/banana for breakfast, protein shake for lunch & food brought by family for dinner; will order ONS in vanilla per pts preference; no indication of weight los per EMR weight history; most recent labs/med sreviewed    Anthropometrics    Height: 5' 3" (160 cm),    Last Weight: 54 kg (119 lb 1.6 oz) (06/29/25 1804), Weight Method: Bed Scale  BMI (Calculated): 21.1  BMI Classification: underweight (BMI less than 22 if >65 years of age)     Ideal Body Weight (IBW), Female: 115 " lb     % Ideal Body Weight, Female (lb): 103.57 %                    Usual Body Weight (UBW), kg:  (120-125 lb)        Usual Weight Provided By: EMR weight history    Wt Readings from Last 5 Encounters:   06/29/25 54 kg (119 lb 1.6 oz)   08/18/23 54.7 kg (120 lb 9.6 oz)   04/19/23 57.2 kg (126 lb 3.2 oz)   02/20/20 58.1 kg (128 lb 1.4 oz)     Weight Change(s) Since Admission: new admit  Wt Readings from Last 1 Encounters:   06/29/25 1804 54 kg (119 lb 1.6 oz)   Admit Weight: 54 kg (119 lb 1.6 oz) (06/29/25 1804), Weight Method: Bed Scale    Estimated Needs    Weight Used For Calorie Calculations: 54 kg (119 lb 0.8 oz)  Energy Calorie Requirements (kcal): 7779-3387 kcal (25 - 30 kcal/kg)  Energy Need Method: Kcal/kg  Weight Used For Protein Calculations: 54 kg (119 lb 0.8 oz)  Protein Requirements: 54-70 gm (1 - 1.3 gm/kg)  Fluid Requirements (mL): 4014-6165 ml (1ml/kcal)        Enteral Nutrition     Patient not receiving enteral nutrition at this time.    Parenteral Nutrition     Patient not receiving parenteral nutrition support at this time.    Evaluation of Received Nutrient Intake    Calories: meeting estimated needs  Protein: meeting estimated needs    Patient Education     Not applicable.    Nutrition Diagnosis     PES: Increased nutrient needs (kcal/protein) related to suboptimal protein/energy intake as evidenced by < 75% po intake PTA. (new)     PES:            Nutrition Interventions     Intervention(s): general/healthful diet, commercial beverage, and collaboration with other providers  Intervention(s):      Goal: Meet greater than 80% of nutritional needs by follow-up. (new)  Goal: Maintain weight throughout hospitalization. (new)    Nutrition Goals & Monitoring     Dietitian will monitor: food and beverage intake and weight  Discharge planning: continue no added salt diet with boost original or equivalent oral supplements  Nutrition Risk/Follow-Up: dietitian will follow-up one time per week   Please  consult if re-assessment needed sooner.

## 2025-06-30 NOTE — PROGRESS NOTES
[facility] Medicine Wards   Progress Note     Resident Team: [facility] Medicine List 1  Attending Physician: Akosua White MD  Resident: Gretta White MD  Intern: Alfonzo Guillen MD   Hospital Length of Stay: 0 days    Subjective:      Brief HPI:  85 y.o. female with  has a past medical history of Arthritis, COPD (chronic obstructive pulmonary disease), Emphysema of lung, Hyperlipidemia, Hypertension, Meniere's disease, unspecified ear, and Thyroid disease. presents to ED for worsening cough. Patients states onset was Monday (6/23/2025) when she was experiencing sore throat, cough, nasal congestion, and nausea, but no fever. She went to [facility] the next day and was diagnoses with URI. No antibiotic was prescribed. She states improvement of sore throat and nasal congestion. However, coughing gradually worsening that she could not tolerate. No sputum produced. She adds mild SOB and nauseated without vomiting. She adds no home oxygen and adherents to Stiolto Respimat and Breztri Aerosphere inhalers for COPD management at home. She denies headache, CP, abdominal pain, decreased appetite, diarrhea/constipation, weakness, depression and anxiety.      In ED, upon arrival, T 100.3, , RR 30, /53, SpO2 90% on N/C. Labs showed WBC 8.51, H/H 11.8/35, PT 14.9, D-dimer 0.79, CO 22, Glc 128, corrected Ca 10.4, and .8. ABG revealed pH 7.49, PO2 56, and HCO3 26.7. CT chest showed no pulmonary embolus indicated. But there are centrilobular emphysematous changes. CXT showed no acute findings. She was given albuterol nebulizer, Duo-Nebs, Zithromax, Ceftriaxone, magnesium sulfate and methylprednisolone.      Admitted to service under observation for further management of COPD exacerbation.     Interval History: no acute events overnight, VSS, remains on 2L NC. Pending respiratory and blood Cx, COVID/Flu negative.    Objective:     Vital Signs (Most Recent):  Temp: 98.2 °F (36.8 °C) (06/30/25 0416)  Pulse: 85 (06/30/25  0416)  Resp: 18 (06/30/25 0416)  BP: 113/62 (06/30/25 0416)  SpO2: (!) 91 % (06/30/25 0416) Vital Signs (24h Range):  Temp:  [97.6 °F (36.4 °C)-100.3 °F (37.9 °C)] 98.2 °F (36.8 °C)  Pulse:  [] 85  Resp:  [18-30] 18  SpO2:  [85 %-95 %] 91 %  BP: ()/(53-64) 113/62       Physical Examination:  Physical Exam  Constitutional:       Appearance: Normal appearance.   Cardiovascular:      Rate and Rhythm: Normal rate and regular rhythm.      Pulses: Normal pulses.      Heart sounds: Normal heart sounds.   Pulmonary:      Effort: No respiratory distress.      Breath sounds: Wheezing and rhonchi present.   Abdominal:      General: Bowel sounds are normal.      Palpations: Abdomen is soft.   Musculoskeletal:         General: Normal range of motion.      Cervical back: Normal range of motion and neck supple.   Skin:     General: Skin is warm and dry.      Capillary Refill: Capillary refill takes less than 2 seconds.   Neurological:      General: No focal deficit present.      Mental Status: She is alert and oriented to person, place, and time.         Laboratory:  Most Recent Data:  Lab Results   Component Value Date     06/29/2025    K 3.6 06/29/2025     06/29/2025    CO2 22 (L) 06/29/2025     (H) 06/29/2025    BUN 15.4 06/29/2025    CREATININE 0.71 06/29/2025    CALCIUM 8.0 (L) 06/29/2025    PROT 7.1 06/29/2025    BILIDIR 0.3 06/29/2020    IBILI 0.50 06/29/2020    ALKPHOS 51 06/29/2025    AST 37 06/29/2025    ALT 25 06/29/2025    MG 1.58 (L) 06/29/2020    PHOS 3.1 06/29/2020        Lab Results   Component Value Date    WBC 8.51 06/29/2025    RBC 3.53 (L) 06/29/2025    HGB 11.8 (L) 06/29/2025    HCT 35.0 (L) 06/29/2025    MCV 99.2 (H) 06/29/2025    MCH 33.4 (H) 06/29/2025    MCHC 33.7 06/29/2025    RDW 14.3 06/29/2025     06/29/2025    MPV 9.8 06/29/2025         Radiology:  Imaging Results              CTA Chest Non-Coronary (PE Studies) (Preliminary result)  Result time 06/29/25 21:17:35       Preliminary result by Jace Piedra MD (06/29/25 21:17:35)                   Narrative:    START OF REPORT:  Technique: CT Scan of the chest was performed with intravenous contrast with direct axial images as well as sagittal and coronal reconstruction images pulmonary embolus protocol.    Dosage Information: Automated Exposure Control was utilized.    Comparison: Comparison is with study dated 2020-06-28 14:15:35.    Clinical History: Cough (Pt in with a non productive cough with shortness of breath and body aches. Pt O2 sat- 79% on room air.).    Findings:  Soft Tissues: The soft tissues of the chest wall appear unremarkable.  Lines and Tubes: None.  Neck: A hyperdense/calcified focus is again seen in the right thyroid lobe on image 14, series 4. Ultrasound correlation is suggested for further evaluation.  Mediastinum: An enlarged mediastinal lymph node is again seen in the precarinal space . This suggests reactive lymphadenopathy.  Heart: The heart appears unremarkable. The heart size is within normal limits. Mild coronary artery calcification is seen. The patient is status post median sternotomy.  Aorta: Moderate aortic calcification is seen in the arch and descending thoracic aorta.  Pulmonary Arteries: No filling defects are seen in the pulmonary arteries to suggest pulmonary embolus.  Lungs: Centrilobular emphysematous changes are again seen in both lungs, most pronounced in both upper lobes. Streaky linear opacity is seen predominantly in the dependent portions at the lung bases consistent with scarring and subsegmental atelectasis. There is some focal more prominent dependent opacity at the right-greater-than-left lung bases. This may reflect nonspecific dependent atelectasis with the possibility of an infectious component particularly at the right base where there are some air bronchograms not entirely excluded. Correlate with clinical and laboratory findings as regards additional evaluation and  follow-up.  Pleura: No effusions or pneumothorax are identified.  Bony Structures:  Spine: Multilevel spondylotic changes are seen in the thoracic spine.  Ribs: The visualized bilateral ribs appear unremarkable.  Abdomen: Surgical clips are seen in the right upper quadrant suggesting prior cholecystectomy.      Impression:  1. No filling defects are seen in the pulmonary arteries to suggest pulmonary embolus.  2. Centrilobular emphysematous changes are again seen in both lungs, most pronounced in both upper lobes. Streaky linear opacity is seen predominantly in the dependent portions at the lung bases consistent with scarring and subsegmental atelectasis. There is some focal more prominent dependent opacity at the right-greater-than-left lung bases. This may reflect nonspecific dependent atelectasis with the possibility of an infectious component particularly at the right base where there are some air bronchograms not entirely excluded. Correlate with clinical and laboratory findings as regards additional evaluation and follow-up.  3. Details and other findings as discussed above.                                         X-Ray Chest PA And Lateral (Final result)  Result time 06/29/25 18:58:02      Final result by Herminio Mansfield MD (06/29/25 18:58:02)                   Impression:      No acute findings in the chest      Electronically signed by: Herminio Mansfield MD  Date:    06/29/2025  Time:    18:58               Narrative:    EXAMINATION:  XR CHEST PA AND LATERAL    CLINICAL HISTORY:  Shortness of breath    COMPARISON:  06/28/2020    FINDINGS:  PA and lateral views of the chest show no focal consolidation, pneumothorax or pleural effusion.  Cardiac silhouette and pulmonary vasculature are normal.  Aorta is partially calcified.  No acute osseous findings.                                      Current Medications:     Infusions:       Scheduled:   albuterol-ipratropium  3 mL Nebulization Q4H WAKE    atorvastatin  80  mg Oral QHS    azithromycin  500 mg Intravenous Q24H    cefTRIAXone (Rocephin) IV (PEDS and ADULTS)  1 g Intravenous Q24H    enoxparin  40 mg Subcutaneous Q24H (prophylaxis, 1700)    FLUoxetine  40 mg Oral Daily    folic acid  1,000 mcg Oral Daily    levothyroxine  75 mcg Oral Daily    methylPREDNISolone injection (PEDS and ADULTS)  60 mg Intravenous Q12H    pantoprazole  40 mg Oral Daily        PRN:    Current Facility-Administered Medications:     acetaminophen, 650 mg, Oral, Q4H PRN    dextrose 50%, 12.5 g, Intravenous, PRN    dextrose 50%, 25 g, Intravenous, PRN    glucagon (human recombinant), 1 mg, Intramuscular, PRN    glucose, 16 g, Oral, PRN    glucose, 24 g, Oral, PRN    melatonin, 6 mg, Oral, Nightly PRN    naloxone, 0.02 mg, Intravenous, PRN    ondansetron, 8 mg, Oral, Q8H PRN    sodium chloride 0.9%, 10 mL, Intravenous, Q12H PRN      No intake or output data in the 24 hours ending 06/30/25 0621    Lines/Drains/Airways       Peripheral Intravenous Line  Duration             Peripheral IV 20 G Left Antecubital -- days                      Assessment & Plan:     Hypoxemic respiratory failure 2/2 COPD exacerbation, likely triggered by recent URI  Tachypnea resolved but still requiring 2 L NC  CXR shows hyperinflated lungs but no acute findings  CTA chest didn't show PE, some centrilobular emphysema, scarring and atelectasis, in addition to consolidation that could reflect and infection  Continue Duo-Nebs q4h while awake  Continue Zithromax 500 mg IV and Ceftriaxone 1 g IV daily x5 days (day 2)  Continue solu-medrol 60 mg bid  Continue O2 and titrate to target SpO2 88-92%  IS  COVID/RSV/FLU negative, pending resp and blood cx     Hx of HLD  Atorvastatin 80 mg PO qhs     Hx of fibromyalgia  Continue home fluoxetine 40 mg PO daily     Anemia, chronic   H/H 11.8/35 on admission (baseline)  Continue home folic acid 1000 mcg PO daily     Hx of hypothyroidism   Continue home levothyroxine 75 mcg PO daily        CODE STATUS: Partial Code  Access: Peripheral  Antibiotics: Zithromax and Ceftriaxone   Diet: Cardiac  DVT Prophylaxis: Lovenox  GI Prophylaxis: Pantoprazole  Fluids: none    Disposition: admitted for hypoxemic respiratory failure on 3L NC, will discharge home once weaned off oxygen.    Gretta White MD  Internal Medicine - PGY-3

## 2025-07-01 LAB
ALBUMIN SERPL-MCNC: 3.2 G/DL (ref 3.4–4.8)
ALBUMIN/GLOB SERPL: 0.9 RATIO (ref 1.1–2)
ALP SERPL-CCNC: 49 UNIT/L (ref 40–150)
ALT SERPL-CCNC: 25 UNIT/L (ref 0–55)
ANION GAP SERPL CALC-SCNC: 9 MEQ/L
AST SERPL-CCNC: 33 UNIT/L (ref 11–45)
BASOPHILS # BLD AUTO: 0.01 X10(3)/MCL
BASOPHILS NFR BLD AUTO: 0.1 %
BILIRUB SERPL-MCNC: 0.5 MG/DL
BUN SERPL-MCNC: 21.4 MG/DL (ref 9.8–20.1)
CALCIUM SERPL-MCNC: 8.6 MG/DL (ref 8.4–10.2)
CHLORIDE SERPL-SCNC: 109 MMOL/L (ref 98–107)
CO2 SERPL-SCNC: 24 MMOL/L (ref 23–31)
CREAT SERPL-MCNC: 0.8 MG/DL (ref 0.55–1.02)
CREAT/UREA NIT SERPL: 27
EOSINOPHIL # BLD AUTO: 0 X10(3)/MCL (ref 0–0.9)
EOSINOPHIL NFR BLD AUTO: 0 %
ERYTHROCYTE [DISTWIDTH] IN BLOOD BY AUTOMATED COUNT: 14.5 % (ref 11.5–17)
GFR SERPLBLD CREATININE-BSD FMLA CKD-EPI: >60 ML/MIN/1.73/M2
GLOBULIN SER-MCNC: 3.7 GM/DL (ref 2.4–3.5)
GLUCOSE SERPL-MCNC: 122 MG/DL (ref 82–115)
HCT VFR BLD AUTO: 34.1 % (ref 37–47)
HGB BLD-MCNC: 11.4 G/DL (ref 12–16)
IMM GRANULOCYTES # BLD AUTO: 0.04 X10(3)/MCL (ref 0–0.04)
IMM GRANULOCYTES NFR BLD AUTO: 0.4 %
LYMPHOCYTES # BLD AUTO: 0.48 X10(3)/MCL (ref 0.6–4.6)
LYMPHOCYTES NFR BLD AUTO: 4.4 %
MCH RBC QN AUTO: 33.4 PG (ref 27–31)
MCHC RBC AUTO-ENTMCNC: 33.4 G/DL (ref 33–36)
MCV RBC AUTO: 100 FL (ref 80–94)
MONOCYTES # BLD AUTO: 0.27 X10(3)/MCL (ref 0.1–1.3)
MONOCYTES NFR BLD AUTO: 2.5 %
NEUTROPHILS # BLD AUTO: 10.04 X10(3)/MCL (ref 2.1–9.2)
NEUTROPHILS NFR BLD AUTO: 92.6 %
NRBC BLD AUTO-RTO: 0 %
PLATELET # BLD AUTO: 197 X10(3)/MCL (ref 130–400)
PMV BLD AUTO: 10.4 FL (ref 7.4–10.4)
POTASSIUM SERPL-SCNC: 4.1 MMOL/L (ref 3.5–5.1)
PROT SERPL-MCNC: 6.9 GM/DL (ref 5.8–7.6)
RBC # BLD AUTO: 3.41 X10(6)/MCL (ref 4.2–5.4)
SODIUM SERPL-SCNC: 142 MMOL/L (ref 136–145)
WBC # BLD AUTO: 10.84 X10(3)/MCL (ref 4.5–11.5)

## 2025-07-01 PROCEDURE — 85025 COMPLETE CBC W/AUTO DIFF WBC: CPT

## 2025-07-01 PROCEDURE — 94640 AIRWAY INHALATION TREATMENT: CPT | Mod: XB

## 2025-07-01 PROCEDURE — 97116 GAIT TRAINING THERAPY: CPT

## 2025-07-01 PROCEDURE — 27000221 HC OXYGEN, UP TO 24 HOURS

## 2025-07-01 PROCEDURE — 97530 THERAPEUTIC ACTIVITIES: CPT

## 2025-07-01 PROCEDURE — 80053 COMPREHEN METABOLIC PANEL: CPT

## 2025-07-01 PROCEDURE — 25000003 PHARM REV CODE 250

## 2025-07-01 PROCEDURE — 27000207 HC ISOLATION

## 2025-07-01 PROCEDURE — 25000242 PHARM REV CODE 250 ALT 637 W/ HCPCS

## 2025-07-01 PROCEDURE — 36415 COLL VENOUS BLD VENIPUNCTURE: CPT

## 2025-07-01 PROCEDURE — 94761 N-INVAS EAR/PLS OXIMETRY MLT: CPT

## 2025-07-01 PROCEDURE — 11000001 HC ACUTE MED/SURG PRIVATE ROOM

## 2025-07-01 PROCEDURE — 94799 UNLISTED PULMONARY SVC/PX: CPT

## 2025-07-01 PROCEDURE — 63600175 PHARM REV CODE 636 W HCPCS

## 2025-07-01 PROCEDURE — 99900035 HC TECH TIME PER 15 MIN (STAT)

## 2025-07-01 PROCEDURE — 63600175 PHARM REV CODE 636 W HCPCS: Performed by: INTERNAL MEDICINE

## 2025-07-01 RX ORDER — BENZONATATE 100 MG/1
100 CAPSULE ORAL 3 TIMES DAILY PRN
Status: DISCONTINUED | OUTPATIENT
Start: 2025-07-01 | End: 2025-07-02 | Stop reason: HOSPADM

## 2025-07-01 RX ORDER — CEFTRIAXONE 2 G/1
2 INJECTION, POWDER, FOR SOLUTION INTRAMUSCULAR; INTRAVENOUS
Status: DISCONTINUED | OUTPATIENT
Start: 2025-07-01 | End: 2025-07-02 | Stop reason: HOSPADM

## 2025-07-01 RX ORDER — GUAIFENESIN 100 MG/5ML
400 LIQUID ORAL EVERY 4 HOURS PRN
Status: DISCONTINUED | OUTPATIENT
Start: 2025-07-01 | End: 2025-07-02 | Stop reason: HOSPADM

## 2025-07-01 RX ADMIN — GUAIFENESIN 400 MG: 100 SOLUTION ORAL at 02:07

## 2025-07-01 RX ADMIN — GUAIFENESIN 600 MG: 600 TABLET, EXTENDED RELEASE ORAL at 08:07

## 2025-07-01 RX ADMIN — GUAIFENESIN 400 MG: 100 SOLUTION ORAL at 08:07

## 2025-07-01 RX ADMIN — ATORVASTATIN CALCIUM 80 MG: 40 TABLET, FILM COATED ORAL at 08:07

## 2025-07-01 RX ADMIN — METHYLPREDNISOLONE SODIUM SUCCINATE 40 MG: 40 INJECTION, POWDER, FOR SOLUTION INTRAMUSCULAR; INTRAVENOUS at 02:07

## 2025-07-01 RX ADMIN — ENOXAPARIN SODIUM 40 MG: 40 INJECTION SUBCUTANEOUS at 05:07

## 2025-07-01 RX ADMIN — METHYLPREDNISOLONE SODIUM SUCCINATE 40 MG: 40 INJECTION, POWDER, FOR SOLUTION INTRAMUSCULAR; INTRAVENOUS at 10:07

## 2025-07-01 RX ADMIN — PANTOPRAZOLE SODIUM 40 MG: 40 TABLET, DELAYED RELEASE ORAL at 08:07

## 2025-07-01 RX ADMIN — IPRATROPIUM BROMIDE AND ALBUTEROL SULFATE 3 ML: 2.5; .5 SOLUTION RESPIRATORY (INHALATION) at 07:07

## 2025-07-01 RX ADMIN — LEVOTHYROXINE SODIUM 75 MCG: 0.05 TABLET ORAL at 08:07

## 2025-07-01 RX ADMIN — IPRATROPIUM BROMIDE AND ALBUTEROL SULFATE 3 ML: 2.5; .5 SOLUTION RESPIRATORY (INHALATION) at 10:07

## 2025-07-01 RX ADMIN — AZITHROMYCIN MONOHYDRATE 500 MG: 500 INJECTION, POWDER, LYOPHILIZED, FOR SOLUTION INTRAVENOUS at 08:07

## 2025-07-01 RX ADMIN — FLUOXETINE HYDROCHLORIDE 40 MG: 20 CAPSULE ORAL at 08:07

## 2025-07-01 RX ADMIN — FOLIC ACID 1000 MCG: 1 TABLET ORAL at 08:07

## 2025-07-01 RX ADMIN — CEFTRIAXONE SODIUM 2 G: 2 INJECTION, POWDER, FOR SOLUTION INTRAMUSCULAR; INTRAVENOUS at 10:07

## 2025-07-01 RX ADMIN — IPRATROPIUM BROMIDE AND ALBUTEROL SULFATE 3 ML: 2.5; .5 SOLUTION RESPIRATORY (INHALATION) at 03:07

## 2025-07-01 NOTE — CONSULTS
Consult for home health for nursing and PT noted. Patient and daughter, Tequila, P: [phone], are in agreement and have chosen [facility]  from list provided. Referral has been sent via Epic.

## 2025-07-01 NOTE — PROGRESS NOTES
[facility] Medicine Wards   Progress Note     Resident Team: [facility] Medicine List 1  Attending Physician: Akosua White MD  Resident: Gretta White MD  Intern: Alfonzo Guillen MD   Hospital Length of Stay: 0 days    Subjective:      Brief HPI:  85 y.o. female with  has a past medical history of Arthritis, COPD (chronic obstructive pulmonary disease), Emphysema of lung, Hyperlipidemia, Hypertension, Meniere's disease, unspecified ear, and Thyroid disease. presents to ED for worsening cough. Patients states onset was Monday (6/23/2025) when she was experiencing sore throat, cough, nasal congestion, and nausea, but no fever. She went to [facility] the next day and was diagnoses with URI. No antibiotic was prescribed. She states improvement of sore throat and nasal congestion. However, coughing gradually worsening that she could not tolerate. No sputum produced. She adds mild SOB and nauseated without vomiting. She adds no home oxygen and adherents to Stiolto Respimat and Breztri Aerosphere inhalers for COPD management at home. She denies headache, CP, abdominal pain, decreased appetite, diarrhea/constipation, weakness, depression and anxiety.      In ED, upon arrival, T 100.3, , RR 30, /53, SpO2 90% on N/C. Labs showed WBC 8.51, H/H 11.8/35, PT 14.9, D-dimer 0.79, CO 22, Glc 128, corrected Ca 10.4, and .8. ABG revealed pH 7.49, PO2 56, and HCO3 26.7. CT chest showed no pulmonary embolus indicated. But there are centrilobular emphysematous changes. CXT showed no acute findings. She was given albuterol nebulizer, Duo-Nebs, Zithromax, Ceftriaxone, magnesium sulfate and methylprednisolone.      Admitted to service under observation for further management of COPD exacerbation.     Interval History: No acute events overnight, VSS, remains on 2L NC. Attempted to wean from 2L NC this morning but had desaturation to 81. No complaints of acute chest pain, leg pain, or fevers. Patient continues to have episodes of  coughing without sputum production. Pending respiratory and blood Cx, COVID/Flu negative, parainfluenza positive.     Objective:     Vital Signs (Most Recent):  Temp: 96.3 °F (35.7 °C) (07/01/25 0408)  Pulse: 76 (07/01/25 0408)  Resp: 16 (07/01/25 0408)  BP: (!) 149/69 (07/01/25 0408)  SpO2: (!) 93 % (07/01/25 0408) Vital Signs (24h Range):  Temp:  [96.3 °F (35.7 °C)-97.7 °F (36.5 °C)] 96.3 °F (35.7 °C)  Pulse:  [68-98] 76  Resp:  [16-22] 16  SpO2:  [92 %-98 %] 93 %  BP: (109-149)/(54-69) 149/69       Physical Examination:  Physical Exam  Constitutional:       Appearance: Normal appearance.   Cardiovascular:      Rate and Rhythm: Normal rate and regular rhythm.      Pulses: Normal pulses.      Heart sounds: Normal heart sounds.   Pulmonary:      Effort: No respiratory distress.      Breath sounds: Wheezing and rhonchi present.   Abdominal:      General: Bowel sounds are normal.      Palpations: Abdomen is soft.   Musculoskeletal:         General: Normal range of motion.      Cervical back: Normal range of motion and neck supple.   Skin:     General: Skin is warm and dry.      Capillary Refill: Capillary refill takes less than 2 seconds.   Neurological:      General: No focal deficit present.      Mental Status: She is alert and oriented to person, place, and time.         Laboratory:  Most Recent Data:  Lab Results   Component Value Date     07/01/2025    K 4.1 07/01/2025     (H) 07/01/2025    CO2 24 07/01/2025     (H) 07/01/2025    BUN 21.4 (H) 07/01/2025    CREATININE 0.80 07/01/2025    CALCIUM 8.6 07/01/2025    PROT 6.9 07/01/2025    BILIDIR 0.3 06/29/2020    IBILI 0.50 06/29/2020    ALKPHOS 49 07/01/2025    AST 33 07/01/2025    ALT 25 07/01/2025    MG 1.58 (L) 06/29/2020    PHOS 3.1 06/29/2020        Lab Results   Component Value Date    WBC 10.84 07/01/2025    RBC 3.41 (L) 07/01/2025    HGB 11.4 (L) 07/01/2025    HCT 34.1 (L) 07/01/2025    .0 (H) 07/01/2025    MCH 33.4 (H) 07/01/2025     MCHC 33.4 07/01/2025    RDW 14.5 07/01/2025     07/01/2025    MPV 10.4 07/01/2025         Radiology:  Imaging Results              CTA Chest Non-Coronary (PE Studies) (Final result)  Result time 06/30/25 07:53:16      Final result by Bay Dhillon MD (06/30/25 07:53:16)                   Impression:      No CT evidence of acute pulmonary embolism.    Small bibasilar opacities favored represent subsegmental atelectasis.    Biapical centrilobular emphysema.    No significant discrepancy between preliminary and final reports.      Electronically signed by: Bay Dhillon  Date:    06/30/2025  Time:    07:53               Narrative:    EXAMINATION:  CTA CHEST NON CORONARY (PE STUDIES)    CLINICAL HISTORY:  Pulmonary embolism (PE) suspected, high prob; .  Nonproductive cough, shortness of breath, and body aches.    TECHNIQUE:  Axial CT images were obtained through the chest after IV administration of 100 mL Omnipaque 350 contrast.  MIP, coronal and sagittal reconstructions submitted and interpreted. Dose reduction techniques including automatic exposure control (AEC) were utilized.    Total  mGycm    COMPARISON:  Chest radiograph 06/29/2025.  CT angiogram chest 06/28/2020.    FINDINGS:  Incidental coarse calcifications noted within the right lobe of the thyroid.  Otherwise, unremarkable evaluation of the base of the neck.    There is narrowing of the AP dimension of the trachea which could be related to phase of respiration or possibly tracheomalacia.  Biapical predominant centrilobular emphysema.  Small bibasilar opacities which are suggestive of subsegmental atelectasis.  No evidence of pleural effusion or pneumothorax.    Cardiac chambers are normal in size.  No evidence of pericardial abnormality.  Mild calcific atherosclerosis of thoracic aorta.  No evidence of thoracic aortic aneurysm or dissection.  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    No evidence of pulmonary arterial embolism.    Prior cholecystectomy.  No acute abnormality of the partially imaged superior abdomen.  Prior median sternotomy.  No acute osseous abnormality.                        Preliminary result by Jace Piedra MD (06/29/25 21:17:35)                   Impression:    1. No filling defects are seen in the pulmonary arteries to suggest pulmonary embolus.  2. Centrilobular emphysematous changes are again seen in both lungs, most pronounced in both upper lobes. Streaky linear opacity is seen predominantly in the dependent portions at the lung bases consistent with scarring and subsegmental atelectasis. There is some focal more prominent dependent opacity at the right-greater-than-left lung bases. This may reflect nonspecific dependent atelectasis with the possibility of an infectious component particularly at the right base where there are some air bronchograms not entirely excluded. Correlate with clinical and laboratory findings as regards additional evaluation and follow-up.  3. Details and other findings as discussed above.               Narrative:    START OF REPORT:  Technique: CT Scan of the chest was performed with intravenous contrast with direct axial images as well as sagittal and coronal reconstruction images pulmonary embolus protocol.    Dosage Information: Automated Exposure Control was utilized.    Comparison: Comparison is with study dated 2020-06-28 14:15:35.    Clinical History: Cough (Pt in with a non productive cough with shortness of breath and body aches. Pt O2 sat- 79% on room air.).    Findings:  Soft Tissues: The soft tissues of the chest wall appear unremarkable.  Lines and Tubes: None.  Neck: A hyperdense/calcified focus is again seen in the right thyroid lobe on image 14, series 4. Ultrasound correlation is suggested for further evaluation.  Mediastinum: An enlarged mediastinal lymph node is again seen in the precarinal  space . This suggests reactive lymphadenopathy.  Heart: The heart appears unremarkable. The heart size is within normal limits. Mild coronary artery calcification is seen. The patient is status post median sternotomy.  Aorta: Moderate aortic calcification is seen in the arch and descending thoracic aorta.  Pulmonary Arteries: No filling defects are seen in the pulmonary arteries to suggest pulmonary embolus.  Lungs: Centrilobular emphysematous changes are again seen in both lungs, most pronounced in both upper lobes. Streaky linear opacity is seen predominantly in the dependent portions at the lung bases consistent with scarring and subsegmental atelectasis. There is some focal more prominent dependent opacity at the right-greater-than-left lung bases. This may reflect nonspecific dependent atelectasis with the possibility of an infectious component particularly at the right base where there are some air bronchograms not entirely excluded. Correlate with clinical and laboratory findings as regards additional evaluation and follow-up.  Pleura: No effusions or pneumothorax are identified.  Bony Structures:  Spine: Multilevel spondylotic changes are seen in the thoracic spine.  Ribs: The visualized bilateral ribs appear unremarkable.  Abdomen: Surgical clips are seen in the right upper quadrant suggesting prior cholecystectomy.                                         X-Ray Chest PA And Lateral (Final result)  Result time 06/29/25 18:58:02      Final result by Herminio Mansfield MD (06/29/25 18:58:02)                   Impression:      No acute findings in the chest      Electronically signed by: Herminio Mansfield MD  Date:    06/29/2025  Time:    18:58               Narrative:    EXAMINATION:  XR CHEST PA AND LATERAL    CLINICAL HISTORY:  Shortness of breath    COMPARISON:  06/28/2020    FINDINGS:  PA and lateral views of the chest show no focal consolidation, pneumothorax or pleural effusion.  Cardiac silhouette and  pulmonary vasculature are normal.  Aorta is partially calcified.  No acute osseous findings.                                      Current Medications:     Infusions:       Scheduled:   albuterol-ipratropium  3 mL Nebulization Q4H WAKE    atorvastatin  80 mg Oral QHS    azithromycin  500 mg Intravenous Q24H    cefTRIAXone (Rocephin) IV (PEDS and ADULTS)  1 g Intravenous Q24H    enoxparin  40 mg Subcutaneous Q24H (prophylaxis, 1700)    FLUoxetine  40 mg Oral Daily    folic acid  1,000 mcg Oral Daily    levothyroxine  75 mcg Oral Daily    methylPREDNISolone injection (PEDS and ADULTS)  60 mg Intravenous Q12H    pantoprazole  40 mg Oral Daily        PRN:    Current Facility-Administered Medications:     acetaminophen, 650 mg, Oral, Q4H PRN    dextrose 50%, 12.5 g, Intravenous, PRN    dextrose 50%, 25 g, Intravenous, PRN    glucagon (human recombinant), 1 mg, Intramuscular, PRN    glucose, 16 g, Oral, PRN    glucose, 24 g, Oral, PRN    guaiFENesin, 600 mg, Oral, BID PRN    melatonin, 6 mg, Oral, Nightly PRN    naloxone, 0.02 mg, Intravenous, PRN    ondansetron, 8 mg, Oral, Q8H PRN    sodium chloride 0.9%, 10 mL, Intravenous, Q12H PRN        Intake/Output Summary (Last 24 hours) at 7/1/2025 0634  Last data filed at 7/1/2025 0633  Gross per 24 hour   Intake 742.67 ml   Output 200 ml   Net 542.67 ml       Lines/Drains/Airways       Peripheral Intravenous Line  Duration             Peripheral IV 20 G Left Antecubital -- days                      Assessment & Plan:     Hypoxemic respiratory failure 2/2 COPD exacerbation, likely triggered by recent URI  Tachypnea resolved but still requiring 2 L NC, was unsuccessful in weaning this morning, with desaturation to 80s, patient reports to have no improvement in coughing and continues to have significant inspiratory crackles and wheezing  CXR shows hyperinflated lungs but no acute findings  CTA chest didn't show PE, some centrilobular emphysema, scarring and atelectasis, in  addition to consolidation that could reflect and infection  Continue Duo-Nebs q4h while awake  Continue Zithromax 500 mg IV and Ceftriaxone 1 g IV daily x5 days (day 3)  Increase methylprednisolone to 40mg TID, will monitor for clinical improvement  Continue O2 and titrate to target SpO2 88-92%  Incentive Spirometry  COVID/RSV/FLU negative, pending resp and blood cx     Hx of HLD  Atorvastatin 80 mg PO qhs     Hx of fibromyalgia  Continue home fluoxetine 40 mg PO daily     Anemia, chronic   H/H 11.8/35 on admission (baseline), no acute changes, 11.4/34.1  Continue home folic acid 1000 mcg PO daily     Hx of hypothyroidism   Continue home levothyroxine 75 mcg PO daily       CODE STATUS: Partial Code  Access: Peripheral  Antibiotics: Zithromax and Ceftriaxone   Diet: Cardiac  DVT Prophylaxis: Lovenox  GI Prophylaxis: Pantoprazole  Fluids: none    Disposition: admitted for hypoxemic respiratory failure on 3L NC, will discharge home once weaned off oxygen     [place] MD Wilmer  PGY-1

## 2025-07-01 NOTE — PLAN OF CARE
07/01/25 1415   Discharge Assessment   Assessment Type Discharge Planning Assessment   Confirmed/corrected address, phone number and insurance Yes   Confirmed Demographics Correct on Facesheet   Source of Information patient;family   Communicated MANE with patient/caregiver Yes   People in Home alone  (Son, James, lives across the street)   Facility Arrived From: Home   Do you expect to return to your current living situation? Yes   Do you have help at home or someone to help you manage your care at home? Yes   Who are your caregiver(s) and their phone number(s)? Tequila Draper, daughter, P: [phone]; James Mcgovern, son, P: [phone]   Prior to hospitilization cognitive status: Alert/Oriented;No Deficits   Current cognitive status: Alert/Oriented;No Deficits   Walking or Climbing Stairs Difficulty yes   Walking or Climbing Stairs ambulation difficulty, requires equipment   Mobility Management Cane, rollator   Dressing/Bathing Difficulty yes   Dressing/Bathing bathing difficulty, requires equipment   Dressing/Bathing Management Shower chair   Home Accessibility wheelchair accessible   Home Layout Able to live on 1st floor   Equipment Currently Used at Home cane, quad;rollator;shower chair;bedside commode;blood pressure machine   Readmission within 30 days? No   Patient currently being followed by outpatient case management? No   Do you currently have service(s) that help you manage your care at home? No   Do you take prescription medications? Yes  ([facility] in [place])   Do you have prescription coverage? Yes   Do you have any problems affording any of your prescribed medications? No   Is the patient taking medications as prescribed? yes   Who is going to help you get home at discharge? Family   How do you get to doctors appointments? car, drives self;family or friend will provide   Are you on dialysis? No   Do you take coumadin? No   Discharge Plan A Home;Home Health   DME  Needed Upon Discharge  other (see comments)  (TBD)   Discharge Plan discussed with: Patient;Adult children   Transition of Care Barriers None   Physical Activity   On average, how many days per week do you engage in moderate to strenuous exercise (like a brisk walk)? 0 days   On average, how many minutes do you engage in exercise at this level? 0 min   Financial Resource Strain   How hard is it for you to pay for the very basics like food, housing, medical care, and heating? Not hard   Housing Stability   In the last 12 months, was there a time when you were not able to pay the mortgage or rent on time? N   At any time in the past 12 months, were you homeless or living in a shelter (including now)? N   Transportation Needs   In the past 12 months, has lack of transportation kept you from medical appointments or from getting medications? no   In the past 12 months, has lack of transportation kept you from meetings, work, or from getting things needed for daily living? No   Food Insecurity   Within the past 12 months, you worried that your food would run out before you got the money to buy more. Never true   Within the past 12 months, the food you bought just didn't last and you didn't have money to get more. Never true   Stress   Do you feel stress - tense, restless, nervous, or anxious, or unable to sleep at night because your mind is troubled all the time - these days? Only a littl   Social Isolation   How often do you feel lonely or isolated from those around you?  Never   Alcohol Use   Q1: How often do you have a drink containing alcohol? Never   Q2: How many drinks containing alcohol do you have on a typical day when you are drinking? None   Q3: How often do you have six or more drinks on one occasion? Never   Utilities   In the past 12 months has the electric, gas, oil, or water company threatened to shut off services in your home? No   Health Literacy   How often do you need to have someone help you when you  read instructions, pamphlets, or other written material from your doctor or pharmacy? Sometimes     Patient is ; resides alone, but son lives across the street; daughter also lives in [place] about 5 minutes away and is available to help as needed; independent with ADL's; CM will follow for DC planning needs.

## 2025-07-01 NOTE — PT/OT/SLP PROGRESS
Physical Therapy Treatment    Patient Name:  Ginette Mcgovern   MRN:  [id number]    Recommendations     Therapy Intensity Recommendations at Discharge: Low Intensity Therapy  Discharge Equipment Recommendations: none   Barriers to discharge: severity of deficits, decreased endurance, limited family support, time since onset, and medical diagnosis    Assessment     Ginette Mcgovern is a 85 y.o. female admitted with a medical diagnosis of  COPD exacerbation.     New DX of Para Influenza - Contact precautions (nursing recommended using a mask due to excessive coughing)    1. Acute exacerbation of chronic obstructive pulmonary disease (COPD)    2. SOB (shortness of breath)    3. Chest pain       Problem List[1]   She presents with the following impairments/functional limitations:  impaired endurance, impaired cardiopulmonary response to activity.    Rehab Prognosis: Good.    Patient would benefit from continued skilled acute PT services to: address above listed impairments/functional limitations; receive patient/caregiver education; reduce fall risk; and maximize independency/safety with functional mobility.    Recent Surgery: * No surgery found *      Plan     During this hospitalization, patient to be seen 5 x/week to address the identified impairments/functional limitations via gait training, therapeutic activities, therapeutic exercises and progress toward the established goals.    Plan of Care Expires:  07/28/25    Subjective     Communicated with patient's nurse Saira prior to session.    Patient agreeable to participate in treatment session.    Chief Complaint: Still coughing  Patient/Family Comments/goals: Feel better and go home  Pain/Comfort:  Pain Rating 1: 0/10  Pain Rating Post-Intervention 1: 0/10    Objective     Patient found with HOB elevated with telemetry, oxygen, PureWick  upon PT entry to room.    General Precautions: Standard, contact, fall   Orthopedic Precautions:N/A   Braces:  N/A  Respiratory  Status: 3 liters/min O2 via nasal cannula    Functional Mobility:    Bed Mobility:  Rolling Left: supervision  Rolling Right: supervision  Supine to Sit: supervision  Sit to Supine: supervision    Transfers:  Sit to Stand: stand by assistance with rolling walker  Stand to Sit: stand by assistance with rolling walker  Bed to Chair: stand by assistance with rolling walker using Stand Pivot  Chair to Bed: stand by assistance with rolling walker using Stand Pivot    Gait:  Patient ambulated 100 ft in room with rolling walker and stand by assistance.  Patient demonstrates :       no loss of balance       SOB and coughing; occasional standing rest breaks. .    Other Mobility:  Therapeutic Activities performed:        -standing balance activities:              weight shifting:                   -forward                 -laterally (left)                 -laterally (right)            reaching:                   -forward                 -diagonal (left)                 -diagonal (right)            Prolonged standing at sink for hygiene tasks ; all completed at SBA with at least 1 UE support.     Patient left sitting edge of bed with all lines intact, call button in reach, tray table at bedside, patient's nurse notified, and daughter present.    DME Justifications:  No DME recommended requiring DME justifications    Education     Patient educated on and assisted with functional mobility as noted above.  Patient educated on PT Plan of Care.  Patient was instructed to utilize staff assistance for mobility/transfers.  White board updated regarding patient's safest level of mobility with staff assistance    Goals     Multidisciplinary Problems       Physical Therapy Goals          Problem: Physical Therapy    Goal Priority Disciplines Outcome Interventions   Physical Therapy Goal     PT, PT/OT Progressing    Description: Goals to be met by: Discharge      Patient will increase functional independence with mobility by  performin. Sit to stand transfer with Modified [place]  2. Bed to chair transfer with Modified [place] using Rolling Walker  3. Gait  x  at least 50 feet at MOD I and up to 130 feet with Modified [place] using Rolling Walker; O2 saturation >88% on room air.                         Time Tracking     PT Received On: 25  PT Start Time: 1000     PT Stop Time: 1023  PT Total Time (min): 23 min     Billable Minutes: Gait Training 15 and Therapeutic Activity 8    2025       [1]   Patient Active Problem List  Diagnosis    Rheumatoid arthritis    Fibromyalgia syndrome    Insomnia    Osteoporosis    COPD exacerbation

## 2025-07-02 VITALS
HEIGHT: 63 IN | TEMPERATURE: 98 F | OXYGEN SATURATION: 97 % | WEIGHT: 119.13 LBS | BODY MASS INDEX: 21.11 KG/M2 | HEART RATE: 89 BPM | RESPIRATION RATE: 18 BRPM | SYSTOLIC BLOOD PRESSURE: 126 MMHG | DIASTOLIC BLOOD PRESSURE: 67 MMHG

## 2025-07-02 LAB
ALBUMIN SERPL-MCNC: 3.1 G/DL (ref 3.4–4.8)
ALBUMIN/GLOB SERPL: 0.9 RATIO (ref 1.1–2)
ALP SERPL-CCNC: 46 UNIT/L (ref 40–150)
ALT SERPL-CCNC: 29 UNIT/L (ref 0–55)
ANION GAP SERPL CALC-SCNC: 7 MEQ/L
AST SERPL-CCNC: 30 UNIT/L (ref 11–45)
BACTERIA SPEC CULT: NORMAL
BASOPHILS # BLD AUTO: 0.01 X10(3)/MCL
BASOPHILS NFR BLD AUTO: 0.1 %
BILIRUB SERPL-MCNC: 0.3 MG/DL
BUN SERPL-MCNC: 16.9 MG/DL (ref 9.8–20.1)
CALCIUM SERPL-MCNC: 8.6 MG/DL (ref 8.4–10.2)
CHLORIDE SERPL-SCNC: 111 MMOL/L (ref 98–107)
CO2 SERPL-SCNC: 24 MMOL/L (ref 23–31)
CREAT SERPL-MCNC: 0.67 MG/DL (ref 0.55–1.02)
CREAT/UREA NIT SERPL: 25
EOSINOPHIL # BLD AUTO: 0 X10(3)/MCL (ref 0–0.9)
EOSINOPHIL NFR BLD AUTO: 0 %
ERYTHROCYTE [DISTWIDTH] IN BLOOD BY AUTOMATED COUNT: 14.3 % (ref 11.5–17)
GFR SERPLBLD CREATININE-BSD FMLA CKD-EPI: >60 ML/MIN/1.73/M2
GLOBULIN SER-MCNC: 3.5 GM/DL (ref 2.4–3.5)
GLUCOSE SERPL-MCNC: 130 MG/DL (ref 82–115)
GRAM STN SPEC: NORMAL
HCT VFR BLD AUTO: 32.9 % (ref 37–47)
HGB BLD-MCNC: 11 G/DL (ref 12–16)
IMM GRANULOCYTES # BLD AUTO: 0.05 X10(3)/MCL (ref 0–0.04)
IMM GRANULOCYTES NFR BLD AUTO: 0.5 %
LYMPHOCYTES # BLD AUTO: 0.47 X10(3)/MCL (ref 0.6–4.6)
LYMPHOCYTES NFR BLD AUTO: 4.9 %
MCH RBC QN AUTO: 33.2 PG (ref 27–31)
MCHC RBC AUTO-ENTMCNC: 33.4 G/DL (ref 33–36)
MCV RBC AUTO: 99.4 FL (ref 80–94)
MONOCYTES # BLD AUTO: 0.22 X10(3)/MCL (ref 0.1–1.3)
MONOCYTES NFR BLD AUTO: 2.3 %
NEUTROPHILS # BLD AUTO: 8.77 X10(3)/MCL (ref 2.1–9.2)
NEUTROPHILS NFR BLD AUTO: 92.2 %
NRBC BLD AUTO-RTO: 0 %
PLATELET # BLD AUTO: 203 X10(3)/MCL (ref 130–400)
PMV BLD AUTO: 9.8 FL (ref 7.4–10.4)
POTASSIUM SERPL-SCNC: 4 MMOL/L (ref 3.5–5.1)
PROT SERPL-MCNC: 6.6 GM/DL (ref 5.8–7.6)
RBC # BLD AUTO: 3.31 X10(6)/MCL (ref 4.2–5.4)
SODIUM SERPL-SCNC: 142 MMOL/L (ref 136–145)
WBC # BLD AUTO: 9.52 X10(3)/MCL (ref 4.5–11.5)

## 2025-07-02 PROCEDURE — 80053 COMPREHEN METABOLIC PANEL: CPT

## 2025-07-02 PROCEDURE — 25000003 PHARM REV CODE 250

## 2025-07-02 PROCEDURE — 85025 COMPLETE CBC W/AUTO DIFF WBC: CPT

## 2025-07-02 PROCEDURE — 63600175 PHARM REV CODE 636 W HCPCS: Mod: JZ,TB | Performed by: INTERNAL MEDICINE

## 2025-07-02 PROCEDURE — 25000242 PHARM REV CODE 250 ALT 637 W/ HCPCS

## 2025-07-02 PROCEDURE — 94640 AIRWAY INHALATION TREATMENT: CPT

## 2025-07-02 PROCEDURE — 36415 COLL VENOUS BLD VENIPUNCTURE: CPT

## 2025-07-02 PROCEDURE — 97116 GAIT TRAINING THERAPY: CPT

## 2025-07-02 PROCEDURE — 97530 THERAPEUTIC ACTIVITIES: CPT

## 2025-07-02 RX ORDER — PREDNISONE 20 MG/1
40 TABLET ORAL DAILY
Qty: 2 TABLET | Refills: 0 | Status: SHIPPED | OUTPATIENT
Start: 2025-07-02

## 2025-07-02 RX ORDER — AMOXICILLIN AND CLAVULANATE POTASSIUM 875; 125 MG/1; MG/1
1 TABLET, FILM COATED ORAL 2 TIMES DAILY
Qty: 2 TABLET | Refills: 0 | Status: SHIPPED | OUTPATIENT
Start: 2025-07-02

## 2025-07-02 RX ORDER — GUAIFENESIN 100 MG/5ML
400 LIQUID ORAL EVERY 4 HOURS PRN
Qty: 180 ML | Refills: 0 | Status: SHIPPED | OUTPATIENT
Start: 2025-07-02 | End: 2025-07-12

## 2025-07-02 RX ORDER — DOXYCYCLINE 100 MG/1
100 CAPSULE ORAL EVERY 12 HOURS
Qty: 2 CAPSULE | Refills: 0 | Status: SHIPPED | OUTPATIENT
Start: 2025-07-02

## 2025-07-02 RX ADMIN — FLUOXETINE HYDROCHLORIDE 40 MG: 20 CAPSULE ORAL at 09:07

## 2025-07-02 RX ADMIN — IPRATROPIUM BROMIDE AND ALBUTEROL SULFATE 3 ML: 2.5; .5 SOLUTION RESPIRATORY (INHALATION) at 11:07

## 2025-07-02 RX ADMIN — IPRATROPIUM BROMIDE AND ALBUTEROL SULFATE 3 ML: 2.5; .5 SOLUTION RESPIRATORY (INHALATION) at 07:07

## 2025-07-02 RX ADMIN — IPRATROPIUM BROMIDE AND ALBUTEROL SULFATE 3 ML: 2.5; .5 SOLUTION RESPIRATORY (INHALATION) at 03:07

## 2025-07-02 RX ADMIN — LEVOTHYROXINE SODIUM 75 MCG: 0.05 TABLET ORAL at 09:07

## 2025-07-02 RX ADMIN — METHYLPREDNISOLONE SODIUM SUCCINATE 40 MG: 40 INJECTION, POWDER, FOR SOLUTION INTRAMUSCULAR; INTRAVENOUS at 05:07

## 2025-07-02 RX ADMIN — PANTOPRAZOLE SODIUM 40 MG: 40 TABLET, DELAYED RELEASE ORAL at 09:07

## 2025-07-02 RX ADMIN — FOLIC ACID 1000 MCG: 1 TABLET ORAL at 09:07

## 2025-07-02 RX ADMIN — BENZONATATE 100 MG: 100 CAPSULE ORAL at 12:07

## 2025-07-02 NOTE — PT/OT/SLP PROGRESS
Physical Therapy Treatment    Patient Name:  Ginette Mcgovern   MRN:  [id number]    Recommendations     Therapy Intensity Recommendations at Discharge: Low Intensity Therapy  Discharge Equipment Recommendations: none, other (see comments) (Possible O2)   Barriers to discharge: severity of deficits, decreased endurance, limited family support, time since onset, and medical diagnosis    Assessment     Gniette Mcgovern is a 85 y.o. female admitted with a medical diagnosis of  COPD exacerbation.     New DX of Para Influenza - Contact precautions (nursing recommended using a mask due to excessive coughing)    1. Acute exacerbation of chronic obstructive pulmonary disease (COPD)    2. SOB (shortness of breath)    3. Chest pain       Problem List[1]   She presents with the following impairments/functional limitations:  impaired endurance, impaired cardiopulmonary response to activity.    Rehab Prognosis: Good.    Patient would benefit from continued skilled acute PT services to: address above listed impairments/functional limitations; receive patient/caregiver education; reduce fall risk; and maximize independency/safety with functional mobility.    Recent Surgery: * No surgery found *      Plan     During this hospitalization, patient to be seen 5 x/week to address the identified impairments/functional limitations via gait training, therapeutic activities, therapeutic exercises and progress toward the established goals.    Plan of Care Expires:  07/28/25    Subjective     Communicated with patient's nurse Saira prior to session.    Patient agreeable to participate in treatment session.    Chief Complaint: Still coughing  Patient/Family Comments/goals: Feel better and go home  Pain/Comfort:  Pain Rating 1: 0/10  Pain Rating Post-Intervention 1: 0/10    Objective     Patient found with HOB elevated with telemetry, oxygen, PureWick  upon PT entry to room.    General Precautions: Standard, contact, fall, droplet, respiratory    Orthopedic Precautions:N/A   Braces:  N/A  Respiratory Status: 2 liters/min O2 via nasal cannula    At rest on 2L O2 via NC: 91%  Ambulation/6 min walk on room air: 81%  Rest on room air: 85%    Functional Mobility:    Bed Mobility:  MOD I with all bed mobility     Transfers:  Sit to Stand: supervision with rolling walker  Stand to Sit: supervision with rolling walker  Bed to Chair: supervision with rolling walker using Stand Pivot  Chair to Bed: supervision with rolling walker using Stand Pivot  Gait:  Patient ambulated 100 ft in room with rolling walker and supervision   Patient demonstrates :       no loss of balance       SOB and mild fatigue. .    Other Mobility:  Therapeutic Activities performed:        -standing balance activities:              weight shifting:                   -forward                 -laterally (left)                 -laterally (right)            reaching:                   -forward                 -diagonal (left)                 -diagonal (right)            Prolonged standing at sink for hygiene tasks ; all completed at supervision with at least 1 UE support.     Patient left sitting edge of bed with all lines intact, call button in reach, tray table at bedside, patient's nurse notified, and daughter present.    DME Justifications:  No DME recommended requiring DME justifications    Education     Patient educated on and assisted with functional mobility as noted above.  Patient educated on PT Plan of Care.  Patient was instructed to utilize staff assistance for mobility/transfers.  White board updated regarding patient's safest level of mobility with staff assistance    Goals     Multidisciplinary Problems       Physical Therapy Goals          Problem: Physical Therapy    Goal Priority Disciplines Outcome Interventions   Physical Therapy Goal     PT, PT/OT Progressing    Description: Goals to be met by: Discharge      Patient will increase functional independence with mobility by  performin. Sit to stand transfer with Modified [place]  2. Bed to chair transfer with Modified [place] using Rolling Walker  3. Gait  x  at least 50 feet at MOD I and up to 130 feet with Modified [place] using Rolling Walker; O2 saturation >88% on room air.                         Time Tracking     PT Received On: 25  PT Start Time: 1100     PT Stop Time: 1123  PT Total Time (min): 23 min     Billable Minutes: Gait Training 15 and Therapeutic Activity 8    2025         [1]   Patient Active Problem List  Diagnosis    Rheumatoid arthritis    Fibromyalgia syndrome    Insomnia    Osteoporosis    COPD exacerbation

## 2025-07-02 NOTE — PROGRESS NOTES
six minute walk test  At rest o2 saturation at 85% room air  O2 saturation at rest 92% on 2L nasal cannula

## 2025-07-02 NOTE — CONSULTS
Consult for DME- home O2 noted. Referral packet, O2 sat note, [facility] [employer] and also emailed to [facility] rep, Carlos, with request to deliver to patient's room today.

## 2025-07-02 NOTE — DISCHARGE SUMMARY
[facility]   DISCHARGE SUMMARY    Patient's Name: Ginette Mcgovern  : 1940  MRN: [id number]  Code Status: Partial Code    Admitting Physician: Akosua White MD  Attending Physician: Akosua White MD  Primary Care Provider: Christiano Kaba MD  Date of Admission: 2025  Date of Discharge: 2025    Condition: Stable  Outcome: Patient tolerated treatment/procedure well without complication and is now ready for discharge.  Disposition: Home or Self Care    Discharge Diagnoses   Problem List[1]    Principal Problem:  COPD exacerbation      Consultants and Procedures   Consultants:  IP CONSULT TO INTERNAL MEDICINE  IP CONSULT TO SOCIAL WORK/CASE MANAGEMENT  IP CONSULT TO SOCIAL WORK/CASE MANAGEMENT  IP CONSULT TO SOCIAL WORK/CASE MANAGEMENT      Brief Admission History and Hospital Course   Ginette Mcgovern is an 85 y.o. female with a past medical history of Arthritis, COPD (chronic obstructive pulmonary disease), Emphysema of lung, Hyperlipidemia, Hypertension, Meniere's disease, and Thyroid disease. presents to ED for worsening cough. Patients states onset was Monday (2025) when she was experiencing sore throat, cough, nasal congestion, and nausea, but no fever. She went to [facility] the next day and was diagnoses with URI. No antibiotic was prescribed. She states improvement of sore throat and nasal congestion. However, coughing was gradually worsening that she could not tolerate. No sputum produced. She adds mild SOB and nauseated without vomiting. She adds no home oxygen and is adherent to Stiolto Respimat and Breztri Aerosphere inhalers for COPD management at home. She denies headache, CP, abdominal pain, decreased appetite, diarrhea/constipation, weakness, depression and anxiety.     In ED, upon arrival, T 100.3, , RR 30, /53, SpO2 90% on N/C. Labs showed WBC 8.51, H/H 11.8/35, PT 14.9, D-dimer 0.79, CO 22, Glc 128, corrected Ca  "10.4, and .8. ABG revealed pH 7.49, PO2 56, and HCO3 26.7. CT chest showed no pulmonary embolus indicated, but with centrilobular emphysematous changes. CXT showed no acute findings. She was given albuterol nebulizer, Duo-Nebs, Zithromax, Ceftriaxone, magnesium sulfate and methylprednisolone. Her clinical status was largely unchanged, so her methylpred dosing was increased with some improvement. Patient reports feeling improved from admission, only complaining of a persistent cough.     Discharge Physical Exam:  Vitals  BP: 126/67  Temp: 98 °F (36.7 °C)  Temp Source: Oral  Pulse: 106  Resp: 20  SpO2: (!) 92 %  Height: 5' 3" (160 cm)  Weight: 54 kg (119 lb 1.6 oz)  Physical Exam  Constitutional:       Appearance: Normal appearance.   HENT:      Head: Normocephalic.      Nose: No congestion.      Mouth/Throat:      Mouth: Mucous membranes are moist.   Eyes:      Extraocular Movements: Extraocular movements intact.      Pupils: Pupils are equal, round, and reactive to light.   Cardiovascular:      Rate and Rhythm: Normal rate and regular rhythm.      Pulses: Normal pulses.   Pulmonary:      Effort: Pulmonary effort is normal.      Breath sounds: Wheezing and rhonchi present.   Abdominal:      General: Abdomen is flat.      Palpations: Abdomen is soft.      Tenderness: There is no abdominal tenderness.   Skin:     General: Skin is warm.      Capillary Refill: Capillary refill takes less than 2 seconds.   Neurological:      General: No focal deficit present.      Mental Status: She is alert and oriented to person, place, and time.            Discharge Medications        Medication List        START taking these medications      amoxicillin-clavulanate 875-125mg 875-125 mg per tablet  Commonly known as: AUGMENTIN  Take 1 tablet by mouth 2 (two) times daily.     doxycycline 100 MG Cap  Commonly known as: VIBRAMYCIN  Take 1 capsule (100 mg total) by mouth every 12 (twelve) hours.     guaiFENesin 100 mg/5 ml 100 mg/5 " mL syrup  Commonly known as: ROBITUSSIN  Take 20 mLs (400 mg total) by mouth every 4 (four) hours as needed for Cough (1st choice).     predniSONE 20 MG tablet  Commonly known as: DELTASONE  Take 2 tablets (40 mg total) by mouth once daily.            CONTINUE taking these medications      BREZTRI AEROSPHERE 160-9-4.8 mcg/actuation Hfaa  Generic drug: budesonide-glycopyr-formoterol     EScitalopram oxalate 20 MG tablet  Commonly known as: LEXAPRO     ezetimibe 10 mg tablet  Commonly known as: ZETIA     FLUoxetine 40 MG capsule     fluticasone propionate 50 mcg/actuation Dsdv  Commonly known as: FLOVENT DISKUS     folic acid 1 MG tablet  Commonly known as: FOLVITE  TAKE ONE TABLET BY MOUTH ONCE A DAY     levocetirizine 5 MG tablet  Commonly known as: XYZAL     levothyroxine 75 MCG tablet  Commonly known as: SYNTHROID     methotrexate 2.5 MG Tab  Take 6 tablets (15 mg total) by mouth every 7 days. EVERY    THURSDAY    TAKE 3 TAB AFTER LUNCH AND 3 TAB AFTER SUPPER     metoprolol succinate 25 MG 24 hr tablet  Commonly known as: TOPROL-XL     omeprazole 20 MG capsule  Commonly known as: PRILOSEC     PROLIA 60 mg/mL Syrg  Generic drug: denosumab     rosuvastatin 40 MG Tab  Commonly known as: CRESTOR     simvastatin 40 MG tablet  Commonly known as: ZOCOR     SINGULAIR 10 mg tablet  Generic drug: montelukast     STIOLTO RESPIMAT 2.5-2.5 mcg/actuation Mist  Generic drug: tiotropium-olodateroL     tiZANidine 2 MG tablet  Commonly known as: ZANAFLEX  Take 1 tablet (2 mg total) by mouth nightly.            STOP taking these medications      amoxicillin 875 MG tablet  Commonly known as: AMOXIL               Where to Get Your Medications        These medications were sent to [place] [id number] [facility] [id number]      Phone: [phone]   amoxicillin-clavulanate 875-125mg 875-125 mg per tablet  doxycycline 100 MG Cap  guaiFENesin 100 mg/5 ml 100 mg/5  mL syrup  predniSONE 20 MG tablet           Discharge Information   Ginette Mcgovern is a 86 yo female with a hx of arthritis, COPD (chronic obstructive pulmonary disease), emphysema of lung, hyperlipidemia, hypertension, Meniere's disease, and thyroid disease who presented with acute exacerbation of COPD. She presented with fever, cough, shortness of breath, for which she was placed on empiric antibiotics and steroids. She improved over the course of her treatment and is clinically stable for discharge. A 6min walk test was conducted and patient will be discharged on home oxygen, as patient had oxygen saturation of 85% on room air and 92% on 2L.     Diet:  As tolerated    Physical Activity:  As tolerated    Instructions:  1. Take all medications as prescribed.  2. Keep all follow-up appointments.  3. Return to the hospital or call your primary care physician if any worsening symptoms occur, such as shortness of breath, chest pain, persistent coughing, or significant decline in .  4. The above information was discussed with the patient in clear terms. she was able to repeat the instructions to me in her own words. All questions answered.  Patient verbalized understanding. ED precautions provided.    Future Appointments:  No future appointments.    TIME SPENT ON DISCHARGE: 30 minutes    Case discussed with Dr. Morton. Please appreciate attending's attestation to follow.    Alfonzo Guillen MD  PGY-I Resident  U Internal Medicine Team 1  07/02/2025            [1]   Patient Active Problem List  Diagnosis    Rheumatoid arthritis    Fibromyalgia syndrome    Insomnia    Osteoporosis    COPD exacerbation

## 2025-07-02 NOTE — PT/OT/SLP PROGRESS
Physical Therapy    Missed Treatment Session - cancel note - 07/02/2025    Patient Name:  Ginette Mcgovern   MRN:  [id number]      Patient not seen at this time secondary to Other (Comment) (Nursing reports pt is resting/sleeping peacfully right now, and has not been able to get rest. PT reported it was no rush and will return later.).    Will follow-up as patient is appropriate/available/agreeable to participate and as therapists' schedule allows.

## 2025-07-02 NOTE — PT/OT/SLP DISCHARGE
POST DISCHARGE DOCUMENTATION - 2025 5:48 PM    Physical Therapy Discharge Note    Documenting Physical Therapist did not evaluate OR treat the patient.    Evaluating/treating Physical Therapist is not available to complete discharge summary.    Refer to prior Physical Therapy note dated 2025 for last known functional status of patient.      Name: Ginette Mcgovern  MRN: [id number]   Principal Problem: COPD exacerbation   1. Acute exacerbation of chronic obstructive pulmonary disease (COPD)    2. SOB (shortness of breath)    3. Chest pain    4. COPD exacerbation       Problem List[1]  Recommendations: per last treatment session     Therapy Intensity Recommendations at Discharge: Low Intensity Therapy  Discharge Equipment Recommendations: none, other (see comments) (Possible O2)     Assessment:     Patient last seen by PT SERVICES on 2025    Patient was unexpectedly discharged from hospital.    Refer to therapy's initial evaluation or last treatment (progress) note for patient's last known functional status, goal achievement and therapists' recommendations.    Objective:     GOALS: STG details - see below    Multidisciplinary Problems       Physical Therapy Goals       Problem: Physical Therapy    Goal Priority Disciplines Outcome Interventions   Physical Therapy Goal     PT, PT/OT Progressing    Description: Goals to be met by: Discharge    Patient will increase functional independence with mobility by performin. Sit to stand transfer with Modified [place]  2. Bed to chair transfer with Modified [place] using Rolling Walker  3. Gait  x  at least 50 feet at MOD I and up to 130 feet with Modified [place] using Rolling Walker; O2 saturation >88% on room air.           Plan:     Patient Discharged to: [facility] per chart.    2025       [1]   Patient Active Problem List  Diagnosis    Rheumatoid arthritis    Fibromyalgia syndrome    Insomnia    Osteoporosis    COPD  exacerbation

## 2025-07-02 NOTE — PLAN OF CARE
Received message from Carlos with [employer], P: [phone], stating that O2 should be delivered in about 30 minutes.    DC summary sent to [employer]  via [employer] fax. Nu Hyde, [employer] rep, has also been notified of DC today.

## 2025-07-02 NOTE — PLAN OF CARE
07/02/25 1416   Final Note   Assessment Type Final Discharge Note   Anticipated Discharge Disposition Home-Health   Post-Acute Status   Post-Acute Authorization HME;Home Health   HME Status (!) Pending Delivery   Home Health Status Set-up Complete/Auth obtained   Discharge Delays None known at this time     [facility] ; [facility] O2 is being delivered and set up by [facility].

## 2025-07-04 LAB
BACTERIA BLD CULT: NORMAL
BACTERIA BLD CULT: NORMAL

## 2025-07-08 DIAGNOSIS — J44.9 CHRONIC OBSTRUCTIVE PULMONARY DISEASE, UNSPECIFIED COPD TYPE: Primary | ICD-10-CM

## 2025-07-15 ENCOUNTER — PROCEDURE VISIT (OUTPATIENT)
Dept: RESPIRATORY THERAPY | Facility: HOSPITAL | Age: 85
End: 2025-07-15
Payer: MEDICARE

## 2025-07-15 DIAGNOSIS — J44.9 CHRONIC OBSTRUCTIVE PULMONARY DISEASE, UNSPECIFIED COPD TYPE: ICD-10-CM

## 2025-07-15 PROCEDURE — 94010 BREATHING CAPACITY TEST: CPT

## 2025-07-15 PROCEDURE — 94727 GAS DIL/WSHOT DETER LNG VOL: CPT

## 2025-07-15 PROCEDURE — 94729 DIFFUSING CAPACITY: CPT
